# Patient Record
Sex: FEMALE
[De-identification: names, ages, dates, MRNs, and addresses within clinical notes are randomized per-mention and may not be internally consistent; named-entity substitution may affect disease eponyms.]

---

## 2018-08-27 ENCOUNTER — CHARTING TRANS (OUTPATIENT)
Dept: OTHER | Age: 28
End: 2018-08-27

## 2018-08-29 ENCOUNTER — CHARTING TRANS (OUTPATIENT)
Dept: OTHER | Age: 28
End: 2018-08-29

## 2021-05-25 ENCOUNTER — OFFICE VISIT (OUTPATIENT)
Dept: INTERNAL MEDICINE CLINIC | Facility: CLINIC | Age: 31
End: 2021-05-25
Payer: COMMERCIAL

## 2021-05-25 VITALS
DIASTOLIC BLOOD PRESSURE: 84 MMHG | WEIGHT: 211.19 LBS | BODY MASS INDEX: 37.89 KG/M2 | RESPIRATION RATE: 18 BRPM | SYSTOLIC BLOOD PRESSURE: 138 MMHG | HEART RATE: 101 BPM | HEIGHT: 62.5 IN | TEMPERATURE: 98 F

## 2021-05-25 DIAGNOSIS — Z00.00 PHYSICAL EXAM, ANNUAL: Primary | ICD-10-CM

## 2021-05-25 DIAGNOSIS — Z91.89 INCREASED RISK OF BREAST CANCER: ICD-10-CM

## 2021-05-25 PROBLEM — F41.9 ANXIETY: Status: ACTIVE | Noted: 2021-05-25

## 2021-05-25 PROBLEM — E66.09 CLASS 2 OBESITY DUE TO EXCESS CALORIES WITHOUT SERIOUS COMORBIDITY IN ADULT: Status: ACTIVE | Noted: 2021-05-25

## 2021-05-25 PROBLEM — Z87.19 HISTORY OF GLUTEN INTOLERANCE: Status: ACTIVE | Noted: 2021-05-25

## 2021-05-25 PROCEDURE — 99385 PREV VISIT NEW AGE 18-39: CPT | Performed by: INTERNAL MEDICINE

## 2021-05-25 PROCEDURE — 3079F DIAST BP 80-89 MM HG: CPT | Performed by: INTERNAL MEDICINE

## 2021-05-25 PROCEDURE — 3008F BODY MASS INDEX DOCD: CPT | Performed by: INTERNAL MEDICINE

## 2021-05-25 PROCEDURE — 3075F SYST BP GE 130 - 139MM HG: CPT | Performed by: INTERNAL MEDICINE

## 2021-05-25 RX ORDER — BUPROPION HYDROCHLORIDE 150 MG/1
150 TABLET ORAL DAILY
COMMUNITY
Start: 2021-01-30 | End: 2021-09-23

## 2021-05-25 RX ORDER — ALBUTEROL SULFATE 90 UG/1
2 AEROSOL, METERED RESPIRATORY (INHALATION)
COMMUNITY
Start: 2018-06-15

## 2021-05-25 RX ORDER — FLUTICASONE PROPIONATE 50 MCG
2 SPRAY, SUSPENSION (ML) NASAL DAILY
COMMUNITY
Start: 2018-06-15

## 2021-05-25 NOTE — PROGRESS NOTES
HPI/Subjective:   Patient ID: Tresa Ascencio is a 31year old female.   Presents for a physical exam    HPI  Patient has concern about her risk for breast cancer, aunt  from breast cancer, mother is positive for BRCA gene, and was positive as well, sh Resp 18   Ht 5' 2.5\" (1.588 m)   Wt 211 lb 3.2 oz (95.8 kg)   LMP 05/05/2021 (Exact Date)   BMI 38.01 kg/m²   Physical Exam  Constitutional:       Appearance: Normal appearance. She is obese. HENT:      Head: Normocephalic and atraumatic.       Right Ea This Encounter      CBC W Differential W Platelet [E]      Comp Metabolic Panel (14) [E]      Lipid Panel [E]      TSH W Reflex To Free T4 [E]      Meds This Visit:  Requested Prescriptions      No prescriptions requested or ordered in this encounter

## 2021-05-26 ENCOUNTER — TELEPHONE (OUTPATIENT)
Dept: GENETICS | Facility: HOSPITAL | Age: 31
End: 2021-05-26

## 2021-05-27 ENCOUNTER — LAB ENCOUNTER (OUTPATIENT)
Dept: LAB | Age: 31
End: 2021-05-27
Attending: INTERNAL MEDICINE
Payer: COMMERCIAL

## 2021-05-27 DIAGNOSIS — Z00.00 PHYSICAL EXAM, ANNUAL: ICD-10-CM

## 2021-05-27 PROCEDURE — 80061 LIPID PANEL: CPT

## 2021-05-27 PROCEDURE — 80053 COMPREHEN METABOLIC PANEL: CPT

## 2021-05-27 PROCEDURE — 85025 COMPLETE CBC W/AUTO DIFF WBC: CPT

## 2021-05-27 PROCEDURE — 84443 ASSAY THYROID STIM HORMONE: CPT

## 2021-05-27 PROCEDURE — 36415 COLL VENOUS BLD VENIPUNCTURE: CPT

## 2021-05-29 ENCOUNTER — TELEPHONE (OUTPATIENT)
Dept: INTERNAL MEDICINE CLINIC | Facility: CLINIC | Age: 31
End: 2021-05-29

## 2021-07-06 ENCOUNTER — TELEPHONE (OUTPATIENT)
Dept: GENETICS | Facility: HOSPITAL | Age: 31
End: 2021-07-06

## 2021-07-26 ENCOUNTER — TELEPHONE (OUTPATIENT)
Dept: GENETICS | Facility: HOSPITAL | Age: 31
End: 2021-07-26

## 2021-07-26 NOTE — TELEPHONE ENCOUNTER
I left  on the following days, 7/7/21, 7/14/21,7/20/21 asking if patient was interested in [employer]

## 2021-09-23 ENCOUNTER — OFFICE VISIT (OUTPATIENT)
Dept: INTEGRATIVE MEDICINE | Facility: CLINIC | Age: 31
End: 2021-09-23
Payer: COMMERCIAL

## 2021-09-23 VITALS
OXYGEN SATURATION: 98 % | SYSTOLIC BLOOD PRESSURE: 122 MMHG | HEART RATE: 82 BPM | DIASTOLIC BLOOD PRESSURE: 82 MMHG | WEIGHT: 215 LBS | HEIGHT: 62.5 IN | BODY MASS INDEX: 38.57 KG/M2

## 2021-09-23 DIAGNOSIS — E66.09 CLASS 2 OBESITY DUE TO EXCESS CALORIES WITHOUT SERIOUS COMORBIDITY WITH BODY MASS INDEX (BMI) OF 38.0 TO 38.9 IN ADULT: ICD-10-CM

## 2021-09-23 DIAGNOSIS — D25.1 FIBROIDS, INTRAMURAL: ICD-10-CM

## 2021-09-23 DIAGNOSIS — L20.82 FLEXURAL ECZEMA: Primary | ICD-10-CM

## 2021-09-23 PROCEDURE — 3079F DIAST BP 80-89 MM HG: CPT | Performed by: FAMILY MEDICINE

## 2021-09-23 PROCEDURE — 3008F BODY MASS INDEX DOCD: CPT | Performed by: FAMILY MEDICINE

## 2021-09-23 PROCEDURE — 99204 OFFICE O/P NEW MOD 45 MIN: CPT | Performed by: FAMILY MEDICINE

## 2021-09-23 PROCEDURE — 3074F SYST BP LT 130 MM HG: CPT | Performed by: FAMILY MEDICINE

## 2021-09-23 NOTE — PROGRESS NOTES
Vitaly Hamlin is a 36year old female. Patient presents with:  Eczema: discuss food sensitivity testing       HPI:   C/o of eczema starting as a child. Notes recent increase in symptoms over last 1 year. Rash is flat red and flaky.  Occurs mostly over Cream Apply topically 2 (two) times daily as needed. 60 g 3   • FLUoxetine 20 MG Oral Cap      • Albuterol Sulfate  (90 Base) MCG/ACT Inhalation Aero Soln Inhale 2 puffs into the lungs.      • Fluticasone Propionate 50 MCG/ACT Nasal Suspension 2 spra Physically Abused: Not on file      Sexually Abused: Not on file  Housing Stability:       Unable to Pay for Housing in the Last Year: Not on file      Number of Places Lived in the Last Year: Not on file      Unstable Housing in the Last Year: Not on file Mood and Affect: Mood normal.         Behavior: Behavior normal.         Thought Content:  Thought content normal.         ASSESSMENT AND PLAN:     [facility] Lab Encounter on 05/27/2021   Component Date Value Ref Range Status   • Glucose 05/27/2021 93  70 - LDL Cholesterol 05/27/2021 110* <100 mg/dL Final    Desirable <100 mg/dL   Borderline 100-129 mg/dL   High     >=130mg/dL       • VLDL 05/27/2021 18  0 - 30 mg/dL Final   • Non HDL Chol 05/27/2021 128  <130 mg/dL Final    Desirable  <130 mg/dL   Borderline 0.20 x10(3) uL Final   • Immature Granulocyte Absolute 05/27/2021 0.03  0.00 - 1.00 x10(3) uL Final   • Neutrophil % 05/27/2021 58.7  % Final   • Lymphocyte % 05/27/2021 30.7  % Final   • Monocyte % 05/27/2021 6.4  % Final   • Eosinophil % 05/27/2021 2.9 0.1% prescribed  Follow-up in 6 weeks      Given further recommendations as below    Orders Placed This Visit:  Orders Placed This Encounter      C-RP/High Sensitivity      Insulin [E]      Hemoglobin A1C      CBC With Differential With Platelet      Comp starting in the mouth, for fast-acting effects. *    DIM + Vitamin C – Diindolylmethane can promote a less reactive immune system. Vitamin C can support diamine oxidase (MICHELE), an enzyme that metabolizes and inactivates histamine. *    Dosing: Take 4 pumps as as to the Products, including, but not limited to its efficacy, benefits or outcomes. Patient agrees to contact his/her healthcare professional and stop use of Products should any reactions arise. Diet Recommendations:      To do discussed pending [place] causes flare-ups  To figure out what causes atopic dermatitis to flare, keep a list of things that seem to affect your skin. Start by filling in the spaces below. Then keep writing them down in a notebook or diary. The things that affect each person vary.

## 2021-09-23 NOTE — PATIENT INSTRUCTIONS
Integrative/Functional Medicine Testing:    [facility] [place] [facility] [place] Food Sensitivity Testing  This test evaluates 22 of the most common food sensitivities tested on the  test. The test measures IgG and Immune Complexes the same way the  test does. The t minutes before meals. Estrodim: Hormones (especially estrogen) are critically important in human development and tissue repair. However, estrogen’s proliferative effects must be balanced and controlled for optimal health.  Therefore, to ensure prop are not liable for the patients use of the Products. [facility] makes no representations or warranties of any kind, expressed or implied, as to the Products, including, but not limited to its efficacy, benefits or outcomes.   Patient agrees to contact his/her heal stress, harsh soaps, and irritants such as dust or wool. Try to stay away from anything that causes flare-ups. Recognizing what causes flare-ups  To figure out what causes atopic dermatitis to flare, keep a list of things that seem to affect your skin.  Mira Schroeder

## 2021-10-28 ENCOUNTER — LAB ENCOUNTER (OUTPATIENT)
Dept: LAB | Facility: HOSPITAL | Age: 31
End: 2021-10-28
Attending: FAMILY MEDICINE
Payer: COMMERCIAL

## 2021-10-28 DIAGNOSIS — E66.09 CLASS 2 OBESITY DUE TO EXCESS CALORIES WITHOUT SERIOUS COMORBIDITY WITH BODY MASS INDEX (BMI) OF 38.0 TO 38.9 IN ADULT: ICD-10-CM

## 2021-10-28 DIAGNOSIS — D25.1 FIBROIDS, INTRAMURAL: ICD-10-CM

## 2021-10-28 DIAGNOSIS — L20.82 FLEXURAL ECZEMA: ICD-10-CM

## 2021-10-28 PROCEDURE — 83036 HEMOGLOBIN GLYCOSYLATED A1C: CPT

## 2021-10-28 PROCEDURE — 36415 COLL VENOUS BLD VENIPUNCTURE: CPT

## 2021-10-28 PROCEDURE — 86141 C-REACTIVE PROTEIN HS: CPT

## 2021-10-28 PROCEDURE — 86003 ALLG SPEC IGE CRUDE XTRC EA: CPT

## 2021-10-28 PROCEDURE — 85025 COMPLETE CBC W/AUTO DIFF WBC: CPT

## 2021-10-28 PROCEDURE — 80053 COMPREHEN METABOLIC PANEL: CPT

## 2021-10-28 PROCEDURE — 83525 ASSAY OF INSULIN: CPT

## 2021-10-28 PROCEDURE — 86628 CANDIDA ANTIBODY: CPT

## 2021-10-28 PROCEDURE — 82785 ASSAY OF IGE: CPT

## 2021-11-04 ENCOUNTER — OFFICE VISIT (OUTPATIENT)
Dept: INTEGRATIVE MEDICINE | Facility: CLINIC | Age: 31
End: 2021-11-04
Payer: COMMERCIAL

## 2021-11-04 VITALS
SYSTOLIC BLOOD PRESSURE: 104 MMHG | DIASTOLIC BLOOD PRESSURE: 80 MMHG | OXYGEN SATURATION: 97 % | BODY MASS INDEX: 39 KG/M2 | HEIGHT: 62.5 IN | HEART RATE: 97 BPM

## 2021-11-04 DIAGNOSIS — L20.82 FLEXURAL ECZEMA: Primary | ICD-10-CM

## 2021-11-04 DIAGNOSIS — R73.03 PREDIABETES: ICD-10-CM

## 2021-11-04 DIAGNOSIS — D25.1 FIBROIDS, INTRAMURAL: ICD-10-CM

## 2021-11-04 PROBLEM — E66.9 OBESITY (BMI 30.0-34.9): Status: ACTIVE | Noted: 2017-04-24

## 2021-11-04 PROCEDURE — 3074F SYST BP LT 130 MM HG: CPT | Performed by: FAMILY MEDICINE

## 2021-11-04 PROCEDURE — 3079F DIAST BP 80-89 MM HG: CPT | Performed by: FAMILY MEDICINE

## 2021-11-04 PROCEDURE — 99214 OFFICE O/P EST MOD 30 MIN: CPT | Performed by: FAMILY MEDICINE

## 2021-11-04 PROCEDURE — 3008F BODY MASS INDEX DOCD: CPT | Performed by: FAMILY MEDICINE

## 2021-11-04 NOTE — PROGRESS NOTES
Gavino Mueller is a 36year old female. Patient presents with: Follow - Up      HPI:   Follow up for Eczema. No interval change in symptoms at this time. Has started elimination diet but it is early on.   We discussed further laboratory testing at t • FLUoxetine 20 MG Oral Cap      • Albuterol Sulfate  (90 Base) MCG/ACT Inhalation Aero Soln Inhale 2 puffs into the lungs. • Fluticasone Propionate 50 MCG/ACT Nasal Suspension 2 sprays by Nasal route daily.          SOCIAL HISTORY:   Social Hi file  Housing Stability:       Unable to Pay for Housing in the Last Year: Not on file      Number of Places Lived in the Last Year: Not on file      Unstable Housing in the Last Year: Not on file    SURGICAL HISTORY:     Past Surgical History:   Procedure with random or fasting glucose levels since these represent specific points in time.           • Glucose 10/28/2021 93  70 - 99 mg/dL Final   • Sodium 10/28/2021 139  136 - 145 mmol/L Final   • Potassium 10/28/2021 4.3  3.5 - 5.1 mmol/L Final   • Chloride 1 WBC 10/28/2021 10.1  4.0 - 11.0 x10(3) uL Final   • RBC 10/28/2021 5.02  3.80 - 5.30 x10(6)uL Final   • HGB 10/28/2021 13.3  12.0 - 16.0 g/dL Final   • HCT 10/28/2021 41.8  35.0 - 48.0 % Final   • MCV 10/28/2021 83.3  80.0 - 100.0 fL Final   • [facility] 10/28/20 -[facility] 05/27/2021 118  >=60 Final   • ALT 05/27/2021 26  13 - 56 U/L Final   • AST 05/27/2021 17  15 - 37 U/L Final   • Alkaline Phosphatase 05/27/2021 88  37 - 98 U/L Final   • Bilirubin, Total 05/27/2021 0.8  0.1 - 2.0 mg/dL Final   • Total Pro 150 times (5–10 mg) the RDA. It is recommended that physicians ask all patients who may be on biotin supplementation to stop biotin consumption at least 72 hours prior to collection of a new sample.      • WBC 05/27/2021 8.7  4.0 - 11.0 x10(3) uL Final   • sensitivity. Endocrine/: History of fibroids with menorrhagia. Currently under care of OB/GYN. [place] testing is pending at this time. Suspect estrogen dominance with further suspicion of PCOS like endocrine abnormalities.     Skin: History of flexue daily in 4-8 oz of water 10-20 minutes before meal       Metabolic Xtra is a powerful combination of nutrients that helps support insulin receptor function as well as healthy glucose metabolism.  It contains alpha lipoid acid, chromium, berberine, and resve Recommendations:     Hepa air filter see attached handout     Recommend at least 150 minutes exercise per week: 30 minutes/day 5 days a week of combined resistance and cardio.         Additional Information:     Follow up 3 months               No follow-up

## 2021-11-04 NOTE — PATIENT INSTRUCTIONS
Integrative/Functional Medicine Testing:    Take an over-the-counter antihistamine daily such as: Xyzal (levocetirizine), Allegra (fexofenadine)      Supplement/Nutrient Support:          Dosing: Start at 4 pumps twice per day for 4-5 days then tTake 4 pum should always consult a licensed health care professional before starting any supplement, dietary, or exercise program, especially if you are pregnant or have any pre-existing injuries or medical conditions.  The patient agrees that the [facility]

## 2021-11-11 ENCOUNTER — MED REC SCAN ONLY (OUTPATIENT)
Dept: INTEGRATIVE MEDICINE | Facility: CLINIC | Age: 31
End: 2021-11-11

## 2022-01-13 ENCOUNTER — TELEPHONE (OUTPATIENT)
Dept: INTEGRATIVE MEDICINE | Facility: CLINIC | Age: 32
End: 2022-01-13

## 2022-01-13 ENCOUNTER — OFFICE VISIT (OUTPATIENT)
Dept: INTEGRATIVE MEDICINE | Facility: CLINIC | Age: 32
End: 2022-01-13
Payer: COMMERCIAL

## 2022-01-13 VITALS
SYSTOLIC BLOOD PRESSURE: 112 MMHG | DIASTOLIC BLOOD PRESSURE: 78 MMHG | OXYGEN SATURATION: 97 % | BODY MASS INDEX: 39.77 KG/M2 | HEART RATE: 90 BPM | HEIGHT: 62.5 IN | WEIGHT: 221.63 LBS

## 2022-01-13 DIAGNOSIS — D25.1 FIBROIDS, INTRAMURAL: Primary | ICD-10-CM

## 2022-01-13 DIAGNOSIS — R73.03 PREDIABETES: ICD-10-CM

## 2022-01-13 DIAGNOSIS — L20.82 FLEXURAL ECZEMA: ICD-10-CM

## 2022-01-13 DIAGNOSIS — E66.09 CLASS 2 OBESITY DUE TO EXCESS CALORIES WITHOUT SERIOUS COMORBIDITY WITH BODY MASS INDEX (BMI) OF 38.0 TO 38.9 IN ADULT: ICD-10-CM

## 2022-01-13 PROCEDURE — 3074F SYST BP LT 130 MM HG: CPT | Performed by: FAMILY MEDICINE

## 2022-01-13 PROCEDURE — 3078F DIAST BP <80 MM HG: CPT | Performed by: FAMILY MEDICINE

## 2022-01-13 PROCEDURE — 3008F BODY MASS INDEX DOCD: CPT | Performed by: FAMILY MEDICINE

## 2022-01-13 PROCEDURE — 99214 OFFICE O/P EST MOD 30 MIN: CPT | Performed by: FAMILY MEDICINE

## 2022-01-13 RX ORDER — SPIRONOLACTONE 25 MG/1
TABLET ORAL
Qty: 180 TABLET | Refills: 0 | OUTPATIENT
Start: 2022-01-13

## 2022-01-13 RX ORDER — SPIRONOLACTONE 25 MG/1
25 TABLET ORAL 2 TIMES DAILY
Qty: 60 TABLET | Refills: 1 | Status: SHIPPED | OUTPATIENT
Start: 2022-01-13 | End: 2022-03-14

## 2022-01-13 NOTE — PATIENT INSTRUCTIONS
Integrative/Functional Medicine Testing:      Inositol is a component of the B-complex family. It supports healthy  central nervous system function, including emotional wellness, healthy  mood and behavior.  Additionally, it may promote ovarian health    Do disease. Ultimately, you must draw your own conclusion as to the efficacy of the Product and immediately stop use of the Products if a negative reaction should arise.   You should always consult a licensed health care professional before starting any supple Seeds  • Flax Seeds  • Turmeric  • Cherelle  • Olive oil   Avoid   • Sugar  • Artificial Sweeteners  • Saturated and Trans fats   • Hydrogenated Oils  o Canola  o Corn Oil  o Peanut  o Soy  • Omega 6 Fatty Acids  • Refined Carbohydrates  • Processed foods  • raw honey  • 1 Tbsp jf seeds    Directions  1. Combine the quinoa and cashew milk in a saucepan and slowly warm over medium low heat. 2.  Stir in blueberries, cinnamon and walnuts until all are evenly warmed. Remove from heat and stir in raw honey.  Bell Ferrell rounds Canola oil   1 avocado, halved, pitted, peeled and chopped   1 tablespoon red wine vinegar   1 teaspoon Sharan mustard   1 tablespoon coarsely chopped oregano leaves   Honey   Olive oil  Salt   Freshly ground black pepper   1 lemon, zested     Direct leaves     Directions  1. Whisk the vinegar, mustard, lemon juice, garlic, olive oil, salt and pepper in small bowl to blend. 2. Combine the vinaigrette and chicken pieces in a large, resealable plastic bag; seal the bag and toss to coat.  Refrigerate, with the cucumber-tomato relish and cover with a liberal amount of dill dressing.   BLACK FRIED RICE W/ SNAP PEAS  Ingredients  2 tablespoons Organic Extra Virgin Coconut Oil  2 medium carrots, diced  1 small yellow onion, diced  1 bunch scallions, white an sliced  Crushed walnuts     Directions   1. Preheat oven to 350 F.    2.  Mix all ingredients in a large bowl. 3.  Fill greased or paper-lined muffin cups 2/3 full. 4. Add banana slice and walnuts and bake for 15-18 minutes.    NO BAKE ALMOND BARS  Ingre resistance and cardio. Education/Reading:            Additional Information:     Follow up in 2 months

## 2022-01-13 NOTE — PROGRESS NOTES
Rand Lindsay is a 36year old female. Patient presents with:  Lab Results: Hormones , diet       HPI:     Overall notes improvement in eczema with reduction diary and peanut butter. Occasionally has exposure to soy which mildly flares up symptoms. lungs.     • Fluticasone Propionate 50 MCG/ACT Nasal Suspension 2 sprays by Nasal route daily.          SOCIAL HISTORY:   Social History    Socioeconomic History      Marital status: Single      Spouse name: Not on file      Number of children: Not on file normal.         Judgment: Judgment normal.         ASSESSMENT AND PLAN:     [facility] Lab Encounter on 10/28/2021   Component Date Value Ref Range Status   • hsCRP 10/28/2021 11.00* <3.00 mg/L Final    Risk Categories     Low Risk      <1.0 mg/L     Average Risk 10/28/2021 0.9* 1.0 - 2.0 Final   • FASTING 10/28/2021 Yes   Final   • Candida Antibody IgG 10/28/2021 0.19  <=0.88 EV Final    Comment: INTERPRETIVE INFORMATION: Candida Ab, IgG      0.88 EV or less: . ......  Negative - No significant level infection. The best evidence for current infection is a significant   change on two appropriately timed specimens where both tests  are done in the same laboratory at the same time.  However,   low levels of IgM antibodies may occa [id number]  : Aury Estrada MD   • Allergen Clam 10/28/2021 <0.10  <0.10 kUA/L Final   • Allergen Corn 10/28/2021 <0.10  <0.10 kUA/L Final   • Allergen Egg White 10/28/2021 0.17* <0.10 kUA/L Final   • Allergen Fish 10/28/2021 <0.10  <0.10 10/28/2021 0.6  % Final   • Immature Granulocyte % 10/28/2021 0.6  % Final      No results found. 1. Fibroids, intramural    2. Class 2 obesity due to excess calories without serious comorbidity with body mass index (BMI) of 38.0 to 38.9 in adult    3. times daily, with or between meals        Metabolic Xtra is a powerful combination of nutrients that helps support insulin receptor function as well as healthy glucose metabolism. It contains alpha lipoid acid, chromium, berberine, and resveratrol.     [place] or exercise program, especially if you are pregnant or have any pre-existing injuries or medical conditions.  The patient agrees that the [facility] and its affiliates and its [facility] are not liable for the patien Crackers, Pastries, Cereals  • Dairy/Casein  • Alcohol  • Deli Meats       RECIPE IDEAS  Breakfast   ENERGIZING PINEAPPLE SMOOTHIE  Ingredients  • 1 cup brewed and cooled green tea  • 2 cups spinach or kale  • 1 cup frozen pineapple chunks  • ? cup cucumbe seeds.    3.  Serve in bowls and top with raw cacao nibs for an added pop of antioxidants. PCFeed.ca. aspx                    Iván Silvestre eggplant and red onions with canola oil and arrange on the grill. Cook the eggplant until soft and grill the onions until they have a slight al. 2. Remove from the grill to a cutting board and let cool slightly.  Once cool, roughly chop and add them t pieces occasionally, for at least 2 hours and up to one day. 3. Preheat the oven to 400 degrees F. Remove chicken from the bag and arrange pieces on a large, greased baking dish. 4. Roast until the chicken is just cooked through, about 1 hour.  If y , thinly sliced  1 cup thinly sliced snap peas  2 garlic cloves, minced  1 tablespoon minced fresh colleen  3 cups cooked black rice (from 1 cup uncooked)  3 tablespoons Liquid Aminos  2 teaspoons toasted sesame oil  1 teaspoon sriracha  2 eggs, be almond flour  3/4 cup unsweetened finely shredded coconut  3/4 cup equivalent powdered sweetener (Use powdered coconut sugar for Paleo or powdered sweetener)  1 cup + 2 Tbsp almond butter (or any nut butter)  2 Tbsp coconut oil  4 1/2 oz dark chocolate?

## 2022-03-17 ENCOUNTER — OFFICE VISIT (OUTPATIENT)
Dept: INTEGRATIVE MEDICINE | Facility: CLINIC | Age: 32
End: 2022-03-17
Payer: COMMERCIAL

## 2022-03-17 VITALS
OXYGEN SATURATION: 98 % | HEIGHT: 62.5 IN | DIASTOLIC BLOOD PRESSURE: 72 MMHG | SYSTOLIC BLOOD PRESSURE: 100 MMHG | BODY MASS INDEX: 40 KG/M2 | HEART RATE: 80 BPM

## 2022-03-17 DIAGNOSIS — R73.03 PREDIABETES: ICD-10-CM

## 2022-03-17 DIAGNOSIS — L20.82 FLEXURAL ECZEMA: ICD-10-CM

## 2022-03-17 DIAGNOSIS — D25.1 FIBROIDS, INTRAMURAL: ICD-10-CM

## 2022-03-17 DIAGNOSIS — B37.9 CANDIDIASIS: Primary | ICD-10-CM

## 2022-03-17 PROCEDURE — 99214 OFFICE O/P EST MOD 30 MIN: CPT | Performed by: FAMILY MEDICINE

## 2022-03-17 PROCEDURE — 3008F BODY MASS INDEX DOCD: CPT | Performed by: FAMILY MEDICINE

## 2022-03-17 PROCEDURE — 3078F DIAST BP <80 MM HG: CPT | Performed by: FAMILY MEDICINE

## 2022-03-17 PROCEDURE — 3074F SYST BP LT 130 MM HG: CPT | Performed by: FAMILY MEDICINE

## 2022-03-17 RX ORDER — NYSTATIN 500000 [USP'U]/1
1 TABLET, COATED ORAL EVERY 8 HOURS SCHEDULED
Qty: 90 TABLET | Refills: 0 | Status: SHIPPED | OUTPATIENT
Start: 2022-03-17

## 2022-04-01 ENCOUNTER — NURSE ONLY (OUTPATIENT)
Dept: LAB | Facility: HOSPITAL | Age: 32
End: 2022-04-01
Attending: OBSTETRICS & GYNECOLOGY
Payer: COMMERCIAL

## 2022-04-01 DIAGNOSIS — B37.9 CANDIDIASIS: ICD-10-CM

## 2022-04-01 DIAGNOSIS — D25.1 FIBROIDS, INTRAMURAL: ICD-10-CM

## 2022-04-01 DIAGNOSIS — Z01.818 PRE-OP TESTING: ICD-10-CM

## 2022-04-01 LAB
ALBUMIN SERPL-MCNC: 3.7 G/DL (ref 3.4–5)
ALBUMIN/GLOB SERPL: 1 {RATIO} (ref 1–2)
ALP LIVER SERPL-CCNC: 88 U/L
ALT SERPL-CCNC: 29 U/L
ANION GAP SERPL CALC-SCNC: 6 MMOL/L (ref 0–18)
AST SERPL-CCNC: 17 U/L (ref 15–37)
B-HCG SERPL-ACNC: <1 MIU/ML
BASOPHILS # BLD AUTO: 0.04 X10(3) UL (ref 0–0.2)
BASOPHILS NFR BLD AUTO: 0.5 %
BILIRUB SERPL-MCNC: 0.6 MG/DL (ref 0.1–2)
BUN BLD-MCNC: 10 MG/DL (ref 7–18)
BUN/CREAT SERPL: 13.2 (ref 10–20)
CALCIUM BLD-MCNC: 9.2 MG/DL (ref 8.5–10.1)
CHLORIDE SERPL-SCNC: 105 MMOL/L (ref 98–112)
CO2 SERPL-SCNC: 28 MMOL/L (ref 21–32)
CREAT BLD-MCNC: 0.76 MG/DL
DEPRECATED RDW RBC AUTO: 43.6 FL (ref 35.1–46.3)
EOSINOPHIL # BLD AUTO: 0.17 X10(3) UL (ref 0–0.7)
EOSINOPHIL NFR BLD AUTO: 2.1 %
ERYTHROCYTE [DISTWIDTH] IN BLOOD BY AUTOMATED COUNT: 14 % (ref 11–15)
EST. AVERAGE GLUCOSE BLD GHB EST-MCNC: 114 MG/DL (ref 68–126)
FASTING STATUS PATIENT QL REPORTED: YES
GLOBULIN PLAS-MCNC: 3.6 G/DL (ref 2.8–4.4)
GLUCOSE BLD-MCNC: 86 MG/DL (ref 70–99)
HBA1C MFR BLD: 5.6 % (ref ?–5.7)
HBV SURFACE AG SER-ACNC: <0.1 [IU]/L
HBV SURFACE AG SERPL QL IA: NONREACTIVE
HCT VFR BLD AUTO: 41.3 %
HCV AB SERPL QL IA: NONREACTIVE
HGB BLD-MCNC: 12.5 G/DL
IMM GRANULOCYTES # BLD AUTO: 0.03 X10(3) UL (ref 0–1)
IMM GRANULOCYTES NFR BLD: 0.4 %
INSULIN SERPL-ACNC: 22 MU/L (ref 3–25)
LYMPHOCYTES # BLD AUTO: 2.74 X10(3) UL (ref 1–4)
LYMPHOCYTES NFR BLD AUTO: 33.4 %
MCH RBC QN AUTO: 25.9 PG (ref 26–34)
MCHC RBC AUTO-ENTMCNC: 30.3 G/DL (ref 31–37)
MCV RBC AUTO: 85.5 FL
MONOCYTES # BLD AUTO: 0.53 X10(3) UL (ref 0.1–1)
MONOCYTES NFR BLD AUTO: 6.5 %
NEUTROPHILS # BLD AUTO: 4.69 X10 (3) UL (ref 1.5–7.7)
NEUTROPHILS # BLD AUTO: 4.69 X10(3) UL (ref 1.5–7.7)
NEUTROPHILS NFR BLD AUTO: 57.1 %
OSMOLALITY SERPL CALC.SUM OF ELEC: 286 MOSM/KG (ref 275–295)
PLATELET # BLD AUTO: 445 10(3)UL (ref 150–450)
POTASSIUM SERPL-SCNC: 4.6 MMOL/L (ref 3.5–5.1)
PROT SERPL-MCNC: 7.3 G/DL (ref 6.4–8.2)
RBC # BLD AUTO: 4.83 X10(6)UL
SODIUM SERPL-SCNC: 139 MMOL/L (ref 136–145)
WBC # BLD AUTO: 8.2 X10(3) UL (ref 4–11)

## 2022-04-01 PROCEDURE — 80053 COMPREHEN METABOLIC PANEL: CPT

## 2022-04-01 PROCEDURE — 86803 HEPATITIS C AB TEST: CPT

## 2022-04-01 PROCEDURE — 36415 COLL VENOUS BLD VENIPUNCTURE: CPT

## 2022-04-01 PROCEDURE — 87389 HIV-1 AG W/HIV-1&-2 AB AG IA: CPT

## 2022-04-01 PROCEDURE — 83036 HEMOGLOBIN GLYCOSYLATED A1C: CPT

## 2022-04-01 PROCEDURE — 87340 HEPATITIS B SURFACE AG IA: CPT

## 2022-04-01 PROCEDURE — 85025 COMPLETE CBC W/AUTO DIFF WBC: CPT

## 2022-04-01 PROCEDURE — 84702 CHORIONIC GONADOTROPIN TEST: CPT

## 2022-04-01 PROCEDURE — 83525 ASSAY OF INSULIN: CPT

## 2022-04-01 RX ORDER — CETIRIZINE HYDROCHLORIDE 10 MG/1
10 TABLET ORAL DAILY
COMMUNITY

## 2022-04-01 RX ORDER — MULTIVITAMIN WITH IRON
1 TABLET ORAL DAILY
COMMUNITY

## 2022-04-01 RX ORDER — MAG HYDROX/ALUMINUM HYD/SIMETH 400-400-40
5000 SUSPENSION, ORAL (FINAL DOSE FORM) ORAL DAILY
COMMUNITY

## 2022-04-01 RX ORDER — SPIRONOLACTONE 25 MG/1
25 TABLET ORAL 2 TIMES DAILY
COMMUNITY

## 2022-04-01 RX ORDER — CHLORAL HYDRATE 500 MG
1000 CAPSULE ORAL 2 TIMES DAILY
COMMUNITY

## 2022-04-03 ENCOUNTER — LAB ENCOUNTER (OUTPATIENT)
Dept: LAB | Facility: HOSPITAL | Age: 32
End: 2022-04-03
Attending: OBSTETRICS & GYNECOLOGY
Payer: COMMERCIAL

## 2022-04-03 DIAGNOSIS — Z01.818 PRE-OP TESTING: ICD-10-CM

## 2022-04-04 LAB — SARS-COV-2 RNA RESP QL NAA+PROBE: NOT DETECTED

## 2022-04-06 ENCOUNTER — ANESTHESIA (OUTPATIENT)
Dept: SURGERY | Facility: HOSPITAL | Age: 32
End: 2022-04-06
Payer: COMMERCIAL

## 2022-04-06 ENCOUNTER — ANESTHESIA EVENT (OUTPATIENT)
Dept: SURGERY | Facility: HOSPITAL | Age: 32
End: 2022-04-06
Payer: COMMERCIAL

## 2022-04-06 ENCOUNTER — HOSPITAL ENCOUNTER (OUTPATIENT)
Facility: HOSPITAL | Age: 32
Setting detail: HOSPITAL OUTPATIENT SURGERY
Discharge: HOME | End: 2022-04-06
Attending: OBSTETRICS & GYNECOLOGY | Admitting: OBSTETRICS & GYNECOLOGY
Payer: COMMERCIAL

## 2022-04-06 VITALS
HEART RATE: 92 BPM | HEIGHT: 62.5 IN | BODY MASS INDEX: 39.05 KG/M2 | WEIGHT: 217.63 LBS | RESPIRATION RATE: 18 BRPM | DIASTOLIC BLOOD PRESSURE: 77 MMHG | SYSTOLIC BLOOD PRESSURE: 119 MMHG | OXYGEN SATURATION: 99 % | TEMPERATURE: 97 F

## 2022-04-06 DIAGNOSIS — Z01.818 PRE-OP TESTING: Primary | ICD-10-CM

## 2022-04-06 LAB — B-HCG UR QL: NEGATIVE

## 2022-04-06 PROCEDURE — 51702 INSERT TEMP BLADDER CATH: CPT | Performed by: OBSTETRICS & GYNECOLOGY

## 2022-04-06 PROCEDURE — 51798 US URINE CAPACITY MEASURE: CPT | Performed by: OBSTETRICS & GYNECOLOGY

## 2022-04-06 PROCEDURE — 88305 TISSUE EXAM BY PATHOLOGIST: CPT | Performed by: OBSTETRICS & GYNECOLOGY

## 2022-04-06 PROCEDURE — 0UJD8ZZ INSPECTION OF UTERUS AND CERVIX, VIA NATURAL OR ARTIFICIAL OPENING ENDOSCOPIC: ICD-10-PCS | Performed by: OBSTETRICS & GYNECOLOGY

## 2022-04-06 PROCEDURE — 81025 URINE PREGNANCY TEST: CPT

## 2022-04-06 PROCEDURE — 0UB94ZZ EXCISION OF UTERUS, PERCUTANEOUS ENDOSCOPIC APPROACH: ICD-10-PCS | Performed by: OBSTETRICS & GYNECOLOGY

## 2022-04-06 DEVICE — ABSORBABLE ADHESION BARRIER, OXIDIZED REGENERATED CELLULOSE
Type: IMPLANTABLE DEVICE | Site: UTERUS | Status: FUNCTIONAL
Brand: GYNECARE INTERCEED

## 2022-04-06 RX ORDER — ONDANSETRON 2 MG/ML
INJECTION INTRAMUSCULAR; INTRAVENOUS AS NEEDED
Status: DISCONTINUED | OUTPATIENT
Start: 2022-04-06 | End: 2022-04-06 | Stop reason: SURG

## 2022-04-06 RX ORDER — SODIUM CHLORIDE, SODIUM LACTATE, POTASSIUM CHLORIDE, CALCIUM CHLORIDE 600; 310; 30; 20 MG/100ML; MG/100ML; MG/100ML; MG/100ML
INJECTION, SOLUTION INTRAVENOUS CONTINUOUS
Status: DISCONTINUED | OUTPATIENT
Start: 2022-04-06 | End: 2022-04-06

## 2022-04-06 RX ORDER — HYDROCODONE BITARTRATE AND ACETAMINOPHEN 5; 325 MG/1; MG/1
2 TABLET ORAL AS NEEDED
Status: COMPLETED | OUTPATIENT
Start: 2022-04-06 | End: 2022-04-06

## 2022-04-06 RX ORDER — DEXAMETHASONE SODIUM PHOSPHATE 4 MG/ML
4 VIAL (ML) INJECTION AS NEEDED
Status: DISCONTINUED | OUTPATIENT
Start: 2022-04-06 | End: 2022-04-06

## 2022-04-06 RX ORDER — HYDROMORPHONE HYDROCHLORIDE 1 MG/ML
0.5 INJECTION, SOLUTION INTRAMUSCULAR; INTRAVENOUS; SUBCUTANEOUS EVERY 5 MIN PRN
Status: DISCONTINUED | OUTPATIENT
Start: 2022-04-06 | End: 2022-04-06

## 2022-04-06 RX ORDER — NALOXONE HYDROCHLORIDE 0.4 MG/ML
80 INJECTION, SOLUTION INTRAMUSCULAR; INTRAVENOUS; SUBCUTANEOUS AS NEEDED
Status: DISCONTINUED | OUTPATIENT
Start: 2022-04-06 | End: 2022-04-06

## 2022-04-06 RX ORDER — NEOSTIGMINE METHYLSULFATE 1 MG/ML
INJECTION INTRAVENOUS AS NEEDED
Status: DISCONTINUED | OUTPATIENT
Start: 2022-04-06 | End: 2022-04-06 | Stop reason: SURG

## 2022-04-06 RX ORDER — ACETAMINOPHEN 500 MG
1000 TABLET ORAL ONCE AS NEEDED
Status: DISCONTINUED | OUTPATIENT
Start: 2022-04-06 | End: 2022-04-06

## 2022-04-06 RX ORDER — LABETALOL HYDROCHLORIDE 5 MG/ML
5 INJECTION, SOLUTION INTRAVENOUS EVERY 5 MIN PRN
Status: DISCONTINUED | OUTPATIENT
Start: 2022-04-06 | End: 2022-04-06

## 2022-04-06 RX ORDER — KETOROLAC TROMETHAMINE 30 MG/ML
INJECTION, SOLUTION INTRAMUSCULAR; INTRAVENOUS AS NEEDED
Status: DISCONTINUED | OUTPATIENT
Start: 2022-04-06 | End: 2022-04-06 | Stop reason: SURG

## 2022-04-06 RX ORDER — EPHEDRINE SULFATE 50 MG/ML
INJECTION INTRAVENOUS AS NEEDED
Status: DISCONTINUED | OUTPATIENT
Start: 2022-04-06 | End: 2022-04-06 | Stop reason: SURG

## 2022-04-06 RX ORDER — BUPIVACAINE HYDROCHLORIDE 5 MG/ML
INJECTION, SOLUTION EPIDURAL; INTRACAUDAL AS NEEDED
Status: DISCONTINUED | OUTPATIENT
Start: 2022-04-06 | End: 2022-04-06 | Stop reason: HOSPADM

## 2022-04-06 RX ORDER — MIDAZOLAM HYDROCHLORIDE 1 MG/ML
1 INJECTION INTRAMUSCULAR; INTRAVENOUS EVERY 5 MIN PRN
Status: DISCONTINUED | OUTPATIENT
Start: 2022-04-06 | End: 2022-04-06

## 2022-04-06 RX ORDER — PHENAZOPYRIDINE HYDROCHLORIDE 200 MG/1
TABLET, FILM COATED ORAL
Status: COMPLETED
Start: 2022-04-06 | End: 2022-04-06

## 2022-04-06 RX ORDER — MEPERIDINE HYDROCHLORIDE 25 MG/ML
12.5 INJECTION INTRAMUSCULAR; INTRAVENOUS; SUBCUTANEOUS AS NEEDED
Status: DISCONTINUED | OUTPATIENT
Start: 2022-04-06 | End: 2022-04-06

## 2022-04-06 RX ORDER — ROCURONIUM BROMIDE 10 MG/ML
INJECTION, SOLUTION INTRAVENOUS AS NEEDED
Status: DISCONTINUED | OUTPATIENT
Start: 2022-04-06 | End: 2022-04-06 | Stop reason: SURG

## 2022-04-06 RX ORDER — KETAMINE HYDROCHLORIDE 50 MG/ML
INJECTION, SOLUTION, CONCENTRATE INTRAMUSCULAR; INTRAVENOUS AS NEEDED
Status: DISCONTINUED | OUTPATIENT
Start: 2022-04-06 | End: 2022-04-06 | Stop reason: SURG

## 2022-04-06 RX ORDER — CLINDAMYCIN PHOSPHATE 900 MG/50ML
INJECTION INTRAVENOUS
Status: DISCONTINUED
Start: 2022-04-06 | End: 2022-04-06

## 2022-04-06 RX ORDER — GLYCOPYRROLATE 0.2 MG/ML
INJECTION, SOLUTION INTRAMUSCULAR; INTRAVENOUS AS NEEDED
Status: DISCONTINUED | OUTPATIENT
Start: 2022-04-06 | End: 2022-04-06 | Stop reason: SURG

## 2022-04-06 RX ORDER — MIDAZOLAM HYDROCHLORIDE 1 MG/ML
INJECTION INTRAMUSCULAR; INTRAVENOUS AS NEEDED
Status: DISCONTINUED | OUTPATIENT
Start: 2022-04-06 | End: 2022-04-06 | Stop reason: SURG

## 2022-04-06 RX ORDER — DEXAMETHASONE SODIUM PHOSPHATE 4 MG/ML
VIAL (ML) INJECTION AS NEEDED
Status: DISCONTINUED | OUTPATIENT
Start: 2022-04-06 | End: 2022-04-06 | Stop reason: SURG

## 2022-04-06 RX ORDER — CLINDAMYCIN PHOSPHATE 900 MG/50ML
900 INJECTION INTRAVENOUS ONCE
Status: COMPLETED | OUTPATIENT
Start: 2022-04-06 | End: 2022-04-06

## 2022-04-06 RX ORDER — VASOPRESSIN 20 U/ML
INJECTION PARENTERAL AS NEEDED
Status: DISCONTINUED | OUTPATIENT
Start: 2022-04-06 | End: 2022-04-06 | Stop reason: HOSPADM

## 2022-04-06 RX ORDER — PHENAZOPYRIDINE HYDROCHLORIDE 200 MG/1
200 TABLET, FILM COATED ORAL ONCE
Status: COMPLETED | OUTPATIENT
Start: 2022-04-06 | End: 2022-04-06

## 2022-04-06 RX ORDER — DIPHENHYDRAMINE HYDROCHLORIDE 50 MG/ML
12.5 INJECTION INTRAMUSCULAR; INTRAVENOUS AS NEEDED
Status: DISCONTINUED | OUTPATIENT
Start: 2022-04-06 | End: 2022-04-06

## 2022-04-06 RX ORDER — ONDANSETRON 2 MG/ML
4 INJECTION INTRAMUSCULAR; INTRAVENOUS AS NEEDED
Status: DISCONTINUED | OUTPATIENT
Start: 2022-04-06 | End: 2022-04-06

## 2022-04-06 RX ORDER — METOCLOPRAMIDE HYDROCHLORIDE 5 MG/ML
10 INJECTION INTRAMUSCULAR; INTRAVENOUS AS NEEDED
Status: DISCONTINUED | OUTPATIENT
Start: 2022-04-06 | End: 2022-04-06

## 2022-04-06 RX ORDER — ACETAMINOPHEN 500 MG
1000 TABLET ORAL ONCE
Status: DISCONTINUED | OUTPATIENT
Start: 2022-04-06 | End: 2022-04-06 | Stop reason: HOSPADM

## 2022-04-06 RX ORDER — TRANEXAMIC ACID 10 MG/ML
INJECTION, SOLUTION INTRAVENOUS AS NEEDED
Status: DISCONTINUED | OUTPATIENT
Start: 2022-04-06 | End: 2022-04-06 | Stop reason: SURG

## 2022-04-06 RX ORDER — PHENYLEPHRINE HCL 10 MG/ML
VIAL (ML) INJECTION AS NEEDED
Status: DISCONTINUED | OUTPATIENT
Start: 2022-04-06 | End: 2022-04-06 | Stop reason: SURG

## 2022-04-06 RX ORDER — HYDROCODONE BITARTRATE AND ACETAMINOPHEN 5; 325 MG/1; MG/1
1 TABLET ORAL AS NEEDED
Status: COMPLETED | OUTPATIENT
Start: 2022-04-06 | End: 2022-04-06

## 2022-04-06 RX ADMIN — ONDANSETRON 4 MG: 2 INJECTION INTRAMUSCULAR; INTRAVENOUS at 08:56:00

## 2022-04-06 RX ADMIN — EPHEDRINE SULFATE 10 MG: 50 INJECTION INTRAVENOUS at 10:33:00

## 2022-04-06 RX ADMIN — CLINDAMYCIN PHOSPHATE 900 MG: 900 INJECTION INTRAVENOUS at 07:50:00

## 2022-04-06 RX ADMIN — GLYCOPYRROLATE 0.6 MG: 0.2 INJECTION, SOLUTION INTRAMUSCULAR; INTRAVENOUS at 11:51:00

## 2022-04-06 RX ADMIN — ROCURONIUM BROMIDE 40 MG: 10 INJECTION, SOLUTION INTRAVENOUS at 07:41:00

## 2022-04-06 RX ADMIN — NEOSTIGMINE METHYLSULFATE 3 MG: 1 INJECTION INTRAVENOUS at 11:51:00

## 2022-04-06 RX ADMIN — EPHEDRINE SULFATE 10 MG: 50 INJECTION INTRAVENOUS at 08:05:00

## 2022-04-06 RX ADMIN — ROCURONIUM BROMIDE 10 MG: 10 INJECTION, SOLUTION INTRAVENOUS at 09:30:00

## 2022-04-06 RX ADMIN — KETAMINE HYDROCHLORIDE 25 MG: 50 INJECTION, SOLUTION, CONCENTRATE INTRAMUSCULAR; INTRAVENOUS at 08:10:00

## 2022-04-06 RX ADMIN — DEXAMETHASONE SODIUM PHOSPHATE 4 MG: 4 MG/ML VIAL (ML) INJECTION at 08:00:00

## 2022-04-06 RX ADMIN — TRANEXAMIC ACID 1000 MG: 10 INJECTION, SOLUTION INTRAVENOUS at 09:33:00

## 2022-04-06 RX ADMIN — PHENYLEPHRINE HCL 100 MCG: 10 MG/ML VIAL (ML) INJECTION at 08:03:00

## 2022-04-06 RX ADMIN — EPHEDRINE SULFATE 10 MG: 50 INJECTION INTRAVENOUS at 09:03:00

## 2022-04-06 RX ADMIN — KETOROLAC TROMETHAMINE 30 MG: 30 INJECTION, SOLUTION INTRAMUSCULAR; INTRAVENOUS at 11:43:00

## 2022-04-06 RX ADMIN — PHENYLEPHRINE HCL 50 MCG: 10 MG/ML VIAL (ML) INJECTION at 09:47:00

## 2022-04-06 RX ADMIN — SODIUM CHLORIDE, SODIUM LACTATE, POTASSIUM CHLORIDE, CALCIUM CHLORIDE: 600; 310; 30; 20 INJECTION, SOLUTION INTRAVENOUS at 10:32:00

## 2022-04-06 RX ADMIN — PHENYLEPHRINE HCL 50 MCG: 10 MG/ML VIAL (ML) INJECTION at 11:12:00

## 2022-04-06 RX ADMIN — SODIUM CHLORIDE, SODIUM LACTATE, POTASSIUM CHLORIDE, CALCIUM CHLORIDE: 600; 310; 30; 20 INJECTION, SOLUTION INTRAVENOUS at 11:53:00

## 2022-04-06 RX ADMIN — PHENYLEPHRINE HCL 50 MCG: 10 MG/ML VIAL (ML) INJECTION at 11:32:00

## 2022-04-06 RX ADMIN — MIDAZOLAM HYDROCHLORIDE 2 MG: 1 INJECTION INTRAMUSCULAR; INTRAVENOUS at 07:39:00

## 2022-04-06 NOTE — ANESTHESIA PROCEDURE NOTES
Airway  Date/Time: 4/6/2022 7:43 AM  Urgency: elective      General Information and Staff    Patient location during procedure: OR  Anesthesiologist: Diana Irving MD  Performed: anesthesiologist     Indications and Patient Condition  Indications for airway management: anesthesia  Spontaneous Ventilation: absent  Sedation level: deep  Preoxygenated: yes  Patient position: sniffing  Mask difficulty assessment: 1 - vent by mask    Final Airway Details  Final airway type: endotracheal airway      Successful airway: ETT  Cuffed: yes   Successful intubation technique: direct laryngoscopy  Endotracheal tube insertion site: oral  Blade: Giovanna  Blade size: #3  ETT size (mm): 7.0    Cormack-Lehane Classification: grade I - full view of glottis  Placement verified by: chest auscultation and capnometry   Measured from: lips  ETT to lips (cm): 21  Number of attempts at approach: 1

## 2022-04-06 NOTE — BRIEF OP NOTE
Pre-Operative Diagnosis: FIBROIDS     Post-Operative Diagnosis: FIBROIDS      Procedure Performed:   HYSTEROSCOPY, LAPAROSCOPIC MYOMECTOMY, MORCELLATION IN A BAG lysis of adhesions, enterolysis    Surgeon(s) and Role:     * Jeanna Snow MD - Primary     * Jose J Jaquez MD - Assisting Surgeon    Assistant(s):  RNFA: Phil Reyes RN     Surgical Findings: fibroids, adhesions     Specimen: fibroids     Estimated Blood Loss: 50cc      Md Yves Rose MD  4/6/2022  9:09 AM

## 2022-04-06 NOTE — INTERVAL H&P NOTE
Pre-op Diagnosis: FIBROIDS    The above referenced H&P was reviewed by Sean Springer MD on 4/6/2022, the patient was examined and no significant changes have occurred in the patient's condition since the H&P was performed. I discussed with the patient and/or legal representative the potential benefits, risks and side effects of this procedure; the likelihood of the patient achieving goals; and potential problems that might occur during recuperation. I discussed reasonable alternatives to the procedure, including risks, benefits and side effects related to the alternatives and risks related to not receiving this procedure. We will proceed with procedure as planned.

## 2022-04-07 NOTE — OPERATIVE REPORT
[facility]    PATIENT'S NAME: Brigette Villegas   ATTENDING PHYSICIAN: Alessia Mcqueen M.D. OPERATING PHYSICIAN: Alessia Mcqueen M.D. PATIENT ACCOUNT#:   [de-identified]    LOCATION:  [facility] 8 EDWP 10  MEDICAL RECORD #:   [id number]       YOB: 1990  ADMISSION DATE:       04/06/2022      OPERATION DATE:  04/06/2022    OPERATIVE REPORT      The patient is a private patient of Dr. Alessia Mcqueen. PREOPERATIVE DIAGNOSIS:  Symptomatic uterine fibroids. POSTOPERATIVE DIAGNOSIS:  Symptomatic uterine fibroids, plus severe right periadnexal adhesions. PROCEDURE:  Examination under anesthesia; hysteroscopy; operative laparoscopy; lysis of severe right periadnexal adhesions, status post appendectomy; multiple myomectomy x4; morcellation in a containment system. ASSISTANT SURGEON:  Alfonso Elizalde MD    ASSISTANT:  Danielle White RN, FA     ANESTHESIA:  Procedure performed under general endotracheal anesthesia. ESTIMATED BLOOD LOSS:  50 mL. COMPLICATIONS:  There were no complications. FINDINGS:  On examination under anesthesia, the uterus was approximately 16-week size. Uterus sounds to 9 cm. Hysteroscopy reveals normal uterine cavity. Both tubal ostia were noted to be normal.  On operative laparoscopy, there was noted to be a normal liver edge and gallbladder. The patient is status post appendectomy. There were severe adhesions from the bowel to the right pelvic sidewall and from the bowel to the adnexa on the right. This is status post appendectomy. Ultimately, we were able to mobilize all these adhesions, and the right adnexa were now completely mobile. The left adnexa were completely mobile, and both adnexa appeared normal.  The uterus was markedly distorted secondary to fibroids. There was noted to be a 7 cm left fundal type 4 to 6 fibroid. Also next to it was a fundal, midline to right 2.5 cm fundal type 5/6 uterine fibroid.   There was noted to be an 8 cm posterior left type 2 to 6 uterine fibroid. There was a small area that was next to the endometrial cavity, but we did not enter it, less than 1 cm. Finally, there was a 2 cm anterior fibroid that was intramuscular to subserosal , type 4 to 6. All fibroids ultimately were excised and the uterus reconstructed. There was no evidence of endometriosis. OPERATIVE TECHNIQUE:  The patient was intubated for the purpose of general endotracheal anesthesia, prepped and draped in usual manner for laparoscopy. Bladder catheterized. Patient examined under anesthesia. Findings noted above. Weighted speculum introduced into the vaginal vault. Anterior lip of the cervix was grasped with a single-tooth tenaculum. Uterus sounded to 9 cm. Hysteroscopy was now performed. Normal uterine cavity was noted. Having performed that, a Jarcho cannula was placed. Attention was now directed toward the umbilicus. The umbilicus was everted with an Allis clamp, and 2 towel clamps were placed on either side of the umbilicus. A small rent made mid umbilical with a #54 blade. Through this, a Veress needle was placed. Insufflation started with carbon dioxide gas, and when the pressure reached 14 mmHg, the Veress needle was removed, replaced by laparoscopy trocar and sleeve. Then, 1 side trocar was removed and replaced by laparoscope. Second and third punctures were now placed laterally under direct visualization. Because of the size of the fibroid uterus, a port was placed above for visualization as well. Our attention was first directed toward the adhesions of the bowel to right adnexa, as well as the bowel to the right pelvic sidewall. These adhesions were now taken down to mobilize not only the bowel, but the adnexa as well. The left adnexa appeared totally normal.    Now, our attention was directed toward the fundal fibroids. The area was infiltrated with dilute Pitressin solution.   Now, a horizontal incision was made over the 2 fibroids across the length of the fundus. The fibroids were now dissected out. The uterus was now repaired in 3 layers of 3-0 V-Loc suture, with the final being a baseball-style closure. The same dissection and removal of fibroids was performed anteriorly and also posteriorly. Posteriorly, we were next to the uterine cavity. Ultimately, we were next to the endometrial cavity, and again, we repaired the uterus in 3 layers, the final being a baseball-style closure of 3-0 V-Loc suture. At the end of the procedure, the Espiner bag was placed through the umbilicus. The fibroids were placed in the bag, the mouth of the bag brought out through the umbilicus. Now, the 12 mm port was placed through the umbilicus. Under direct visualization from midline upper port, we were able to calhoun the bag under direct visualization. This allowed us to place a laparoscope from this right port and to place the morcellator through the mouth of the bag at the umbilicus. Now, morcellation was performed without complication. Once the fibroids had been removed, the bag removed, the laparoscope replaced, and meticulous hemostasis noted. Interceed was now placed over the repaired uterine defect, abdomen suctioned, incision closed in the usual fashion. Patient extubated and taken to recovery room in satisfactory condition.      Dictated By López Wakefield M.D.  d: 04/06/2022 12:56:44  t: 04/06/2022 20:49:26  Job [id number]  PQ/

## 2022-05-05 RX ORDER — SPIRONOLACTONE 25 MG/1
TABLET ORAL
Qty: 60 TABLET | Refills: 0 | Status: SHIPPED | OUTPATIENT
Start: 2022-05-05 | End: 2022-05-05

## 2022-05-05 RX ORDER — SPIRONOLACTONE 25 MG/1
TABLET ORAL
Qty: 180 TABLET | Refills: 0 | Status: SHIPPED | OUTPATIENT
Start: 2022-05-05

## 2022-06-17 ENCOUNTER — OFFICE VISIT (OUTPATIENT)
Dept: OBGYN CLINIC | Facility: CLINIC | Age: 32
End: 2022-06-17
Payer: COMMERCIAL

## 2022-06-17 VITALS
WEIGHT: 218.38 LBS | SYSTOLIC BLOOD PRESSURE: 120 MMHG | BODY MASS INDEX: 39.18 KG/M2 | HEIGHT: 62.5 IN | DIASTOLIC BLOOD PRESSURE: 80 MMHG

## 2022-06-17 DIAGNOSIS — D21.9 FIBROIDS: Primary | ICD-10-CM

## 2022-06-17 DIAGNOSIS — E28.2 PCOS (POLYCYSTIC OVARIAN SYNDROME): ICD-10-CM

## 2022-06-17 PROCEDURE — 3079F DIAST BP 80-89 MM HG: CPT | Performed by: OBSTETRICS & GYNECOLOGY

## 2022-06-17 PROCEDURE — 99203 OFFICE O/P NEW LOW 30 MIN: CPT | Performed by: OBSTETRICS & GYNECOLOGY

## 2022-06-17 PROCEDURE — 3008F BODY MASS INDEX DOCD: CPT | Performed by: OBSTETRICS & GYNECOLOGY

## 2022-06-17 PROCEDURE — 3074F SYST BP LT 130 MM HG: CPT | Performed by: OBSTETRICS & GYNECOLOGY

## 2022-06-23 ENCOUNTER — OFFICE VISIT (OUTPATIENT)
Dept: INTEGRATIVE MEDICINE | Facility: CLINIC | Age: 32
End: 2022-06-23
Payer: COMMERCIAL

## 2022-06-23 VITALS
SYSTOLIC BLOOD PRESSURE: 122 MMHG | OXYGEN SATURATION: 98 % | WEIGHT: 218 LBS | HEART RATE: 110 BPM | HEIGHT: 62.5 IN | BODY MASS INDEX: 39.11 KG/M2 | DIASTOLIC BLOOD PRESSURE: 78 MMHG

## 2022-06-23 DIAGNOSIS — L20.82 FLEXURAL ECZEMA: Primary | ICD-10-CM

## 2022-06-23 DIAGNOSIS — R73.03 PREDIABETES: ICD-10-CM

## 2022-06-23 DIAGNOSIS — D25.1 FIBROIDS, INTRAMURAL: ICD-10-CM

## 2022-06-23 DIAGNOSIS — E28.2 PCOS (POLYCYSTIC OVARIAN SYNDROME): ICD-10-CM

## 2022-06-23 PROCEDURE — 3008F BODY MASS INDEX DOCD: CPT | Performed by: FAMILY MEDICINE

## 2022-06-23 PROCEDURE — 3078F DIAST BP <80 MM HG: CPT | Performed by: FAMILY MEDICINE

## 2022-06-23 PROCEDURE — 99214 OFFICE O/P EST MOD 30 MIN: CPT | Performed by: FAMILY MEDICINE

## 2022-06-23 PROCEDURE — 3074F SYST BP LT 130 MM HG: CPT | Performed by: FAMILY MEDICINE

## 2022-07-01 ENCOUNTER — OFFICE VISIT (OUTPATIENT)
Dept: OBGYN CLINIC | Facility: CLINIC | Age: 32
End: 2022-07-01
Payer: COMMERCIAL

## 2022-07-01 ENCOUNTER — ULTRASOUND ENCOUNTER (OUTPATIENT)
Dept: OBGYN CLINIC | Facility: CLINIC | Age: 32
End: 2022-07-01
Payer: COMMERCIAL

## 2022-07-01 DIAGNOSIS — D21.9 FIBROIDS: Primary | ICD-10-CM

## 2022-07-01 DIAGNOSIS — D21.9 FIBROIDS: ICD-10-CM

## 2022-07-01 DIAGNOSIS — E28.2 PCOS (POLYCYSTIC OVARIAN SYNDROME): ICD-10-CM

## 2022-07-24 NOTE — TELEPHONE ENCOUNTER
Left VM regarding consult for genetic Counseling  Left call back number
CT negative.  Tolerated PO.  Sleeping comfortably. Anticipatory guidance was given regarding diagnosis(es), expected course, reasons to return for emergent re-evaluation, and home care. Caregiver questions were answered.  The patient was discharged in stable condition.

## 2022-09-29 ENCOUNTER — OFFICE VISIT (OUTPATIENT)
Dept: ORTHOPEDICS CLINIC | Facility: CLINIC | Age: 32
End: 2022-09-29

## 2022-09-29 ENCOUNTER — HOSPITAL ENCOUNTER (OUTPATIENT)
Dept: GENERAL RADIOLOGY | Age: 32
Discharge: HOME | End: 2022-09-29
Attending: PODIATRIST
Payer: COMMERCIAL

## 2022-09-29 DIAGNOSIS — M72.2 PLANTAR FASCIITIS: ICD-10-CM

## 2022-09-29 DIAGNOSIS — M79.671 RIGHT FOOT PAIN: Primary | ICD-10-CM

## 2022-09-29 DIAGNOSIS — M79.671 RIGHT FOOT PAIN: ICD-10-CM

## 2022-09-29 DIAGNOSIS — M79.671 ARCH PAIN OF RIGHT FOOT: ICD-10-CM

## 2022-09-29 PROCEDURE — 99204 OFFICE O/P NEW MOD 45 MIN: CPT | Performed by: PODIATRIST

## 2022-09-29 PROCEDURE — 73630 X-RAY EXAM OF FOOT: CPT | Performed by: PODIATRIST

## 2022-10-04 ENCOUNTER — TELEPHONE (OUTPATIENT)
Dept: PHYSICAL THERAPY | Facility: HOSPITAL | Age: 32
End: 2022-10-04

## 2022-10-05 ENCOUNTER — OFFICE VISIT (OUTPATIENT)
Dept: PHYSICAL THERAPY | Facility: HOSPITAL | Age: 32
End: 2022-10-05
Attending: PODIATRIST
Payer: COMMERCIAL

## 2022-10-05 DIAGNOSIS — M79.671 RIGHT FOOT PAIN: ICD-10-CM

## 2022-10-05 DIAGNOSIS — M79.671 ARCH PAIN OF RIGHT FOOT: ICD-10-CM

## 2022-10-05 DIAGNOSIS — M72.2 PLANTAR FASCIITIS: ICD-10-CM

## 2022-10-05 PROCEDURE — 97110 THERAPEUTIC EXERCISES: CPT

## 2022-10-05 PROCEDURE — 97161 PT EVAL LOW COMPLEX 20 MIN: CPT

## 2022-10-07 ENCOUNTER — OFFICE VISIT (OUTPATIENT)
Dept: PHYSICAL THERAPY | Facility: HOSPITAL | Age: 32
End: 2022-10-07
Attending: PODIATRIST
Payer: COMMERCIAL

## 2022-10-07 PROCEDURE — 97110 THERAPEUTIC EXERCISES: CPT

## 2022-10-07 PROCEDURE — 97140 MANUAL THERAPY 1/> REGIONS: CPT

## 2022-10-13 ENCOUNTER — APPOINTMENT (OUTPATIENT)
Dept: PHYSICAL THERAPY | Facility: HOSPITAL | Age: 32
End: 2022-10-13
Attending: PODIATRIST
Payer: COMMERCIAL

## 2022-10-20 ENCOUNTER — OFFICE VISIT (OUTPATIENT)
Dept: PHYSICAL THERAPY | Facility: HOSPITAL | Age: 32
End: 2022-10-20
Attending: PODIATRIST
Payer: COMMERCIAL

## 2022-10-20 PROCEDURE — 97140 MANUAL THERAPY 1/> REGIONS: CPT

## 2022-10-20 PROCEDURE — 97110 THERAPEUTIC EXERCISES: CPT

## 2022-10-28 ENCOUNTER — OFFICE VISIT (OUTPATIENT)
Dept: PHYSICAL THERAPY | Facility: HOSPITAL | Age: 32
End: 2022-10-28
Attending: PODIATRIST
Payer: COMMERCIAL

## 2022-10-28 PROCEDURE — 97140 MANUAL THERAPY 1/> REGIONS: CPT

## 2022-10-28 PROCEDURE — 97110 THERAPEUTIC EXERCISES: CPT

## 2022-11-01 ENCOUNTER — APPOINTMENT (OUTPATIENT)
Dept: PHYSICAL THERAPY | Facility: HOSPITAL | Age: 32
End: 2022-11-01
Attending: PODIATRIST
Payer: COMMERCIAL

## 2022-11-03 ENCOUNTER — OFFICE VISIT (OUTPATIENT)
Dept: INTERNAL MEDICINE CLINIC | Facility: CLINIC | Age: 32
End: 2022-11-03
Payer: COMMERCIAL

## 2022-11-03 VITALS
WEIGHT: 225 LBS | HEIGHT: 62 IN | SYSTOLIC BLOOD PRESSURE: 135 MMHG | RESPIRATION RATE: 16 BRPM | HEART RATE: 78 BPM | DIASTOLIC BLOOD PRESSURE: 90 MMHG | BODY MASS INDEX: 41.41 KG/M2

## 2022-11-03 DIAGNOSIS — R73.03 PREDIABETES: Primary | ICD-10-CM

## 2022-11-03 DIAGNOSIS — R06.83 SNORING: ICD-10-CM

## 2022-11-03 DIAGNOSIS — Z00.00 ANNUAL PHYSICAL EXAM: ICD-10-CM

## 2022-11-03 PROCEDURE — 99214 OFFICE O/P EST MOD 30 MIN: CPT | Performed by: NURSE PRACTITIONER

## 2022-11-03 PROCEDURE — 3080F DIAST BP >= 90 MM HG: CPT | Performed by: NURSE PRACTITIONER

## 2022-11-03 PROCEDURE — 3008F BODY MASS INDEX DOCD: CPT | Performed by: NURSE PRACTITIONER

## 2022-11-03 PROCEDURE — 3075F SYST BP GE 130 - 139MM HG: CPT | Performed by: NURSE PRACTITIONER

## 2022-11-04 ENCOUNTER — OFFICE VISIT (OUTPATIENT)
Dept: PHYSICAL THERAPY | Facility: HOSPITAL | Age: 32
End: 2022-11-04
Attending: PODIATRIST
Payer: COMMERCIAL

## 2022-11-04 ENCOUNTER — TELEPHONE (OUTPATIENT)
Dept: PHYSICAL THERAPY | Facility: HOSPITAL | Age: 32
End: 2022-11-04

## 2022-11-04 PROCEDURE — 97110 THERAPEUTIC EXERCISES: CPT

## 2022-11-04 PROCEDURE — 97112 NEUROMUSCULAR REEDUCATION: CPT

## 2022-11-04 NOTE — TELEPHONE ENCOUNTER
LVM after pt missed 10am appointment today, 11/4, last scheduled visit. Offered 12:15 appointment today and instructed patient to call scheduling number to add more visits.

## 2022-11-05 ENCOUNTER — LAB ENCOUNTER (OUTPATIENT)
Dept: LAB | Facility: HOSPITAL | Age: 32
End: 2022-11-05
Attending: NURSE PRACTITIONER
Payer: COMMERCIAL

## 2022-11-05 DIAGNOSIS — R73.03 PREDIABETES: ICD-10-CM

## 2022-11-05 DIAGNOSIS — Z00.00 ANNUAL PHYSICAL EXAM: ICD-10-CM

## 2022-11-05 LAB
ALBUMIN SERPL-MCNC: 3.4 G/DL (ref 3.4–5)
ALBUMIN/GLOB SERPL: 0.9 {RATIO} (ref 1–2)
ALP LIVER SERPL-CCNC: 87 U/L
ALT SERPL-CCNC: 31 U/L
ANION GAP SERPL CALC-SCNC: 7 MMOL/L (ref 0–18)
AST SERPL-CCNC: 17 U/L (ref 15–37)
BASOPHILS # BLD AUTO: 0.06 X10(3) UL (ref 0–0.2)
BASOPHILS NFR BLD AUTO: 0.8 %
BILIRUB SERPL-MCNC: 0.9 MG/DL (ref 0.1–2)
BUN BLD-MCNC: 9 MG/DL (ref 7–18)
BUN/CREAT SERPL: 12.3 (ref 10–20)
CALCIUM BLD-MCNC: 9 MG/DL (ref 8.5–10.1)
CHLORIDE SERPL-SCNC: 107 MMOL/L (ref 98–112)
CHOLEST SERPL-MCNC: 156 MG/DL (ref ?–200)
CO2 SERPL-SCNC: 25 MMOL/L (ref 21–32)
CREAT BLD-MCNC: 0.73 MG/DL
DEPRECATED RDW RBC AUTO: 45.6 FL (ref 35.1–46.3)
EOSINOPHIL # BLD AUTO: 0.25 X10(3) UL (ref 0–0.7)
EOSINOPHIL NFR BLD AUTO: 3.3 %
ERYTHROCYTE [DISTWIDTH] IN BLOOD BY AUTOMATED COUNT: 15 % (ref 11–15)
EST. AVERAGE GLUCOSE BLD GHB EST-MCNC: 105 MG/DL (ref 68–126)
FASTING PATIENT LIPID ANSWER: YES
FASTING STATUS PATIENT QL REPORTED: YES
GFR SERPLBLD BASED ON 1.73 SQ M-ARVRAT: 113 ML/MIN/1.73M2 (ref 60–?)
GLOBULIN PLAS-MCNC: 3.9 G/DL (ref 2.8–4.4)
GLUCOSE BLD-MCNC: 83 MG/DL (ref 70–99)
HBA1C MFR BLD: 5.3 % (ref ?–5.7)
HCT VFR BLD AUTO: 40.2 %
HDLC SERPL-MCNC: 43 MG/DL (ref 40–59)
HGB BLD-MCNC: 13.2 G/DL
IMM GRANULOCYTES # BLD AUTO: 0.04 X10(3) UL (ref 0–1)
IMM GRANULOCYTES NFR BLD: 0.5 %
INSULIN SERPL-ACNC: 12.8 MU/L (ref 3–25)
LDLC SERPL CALC-MCNC: 97 MG/DL (ref ?–100)
LYMPHOCYTES # BLD AUTO: 2.51 X10(3) UL (ref 1–4)
LYMPHOCYTES NFR BLD AUTO: 33.5 %
MCH RBC QN AUTO: 28 PG (ref 26–34)
MCHC RBC AUTO-ENTMCNC: 32.8 G/DL (ref 31–37)
MCV RBC AUTO: 85.4 FL
MONOCYTES # BLD AUTO: 0.38 X10(3) UL (ref 0.1–1)
MONOCYTES NFR BLD AUTO: 5.1 %
NEUTROPHILS # BLD AUTO: 4.25 X10 (3) UL (ref 1.5–7.7)
NEUTROPHILS # BLD AUTO: 4.25 X10(3) UL (ref 1.5–7.7)
NEUTROPHILS NFR BLD AUTO: 56.8 %
NONHDLC SERPL-MCNC: 113 MG/DL (ref ?–130)
OSMOLALITY SERPL CALC.SUM OF ELEC: 286 MOSM/KG (ref 275–295)
PLATELET # BLD AUTO: 401 10(3)UL (ref 150–450)
POTASSIUM SERPL-SCNC: 4.3 MMOL/L (ref 3.5–5.1)
PROT SERPL-MCNC: 7.3 G/DL (ref 6.4–8.2)
RBC # BLD AUTO: 4.71 X10(6)UL
SODIUM SERPL-SCNC: 139 MMOL/L (ref 136–145)
TRIGL SERPL-MCNC: 85 MG/DL (ref 30–149)
TSI SER-ACNC: 2.55 MIU/ML (ref 0.36–3.74)
VLDLC SERPL CALC-MCNC: 14 MG/DL (ref 0–30)
WBC # BLD AUTO: 7.5 X10(3) UL (ref 4–11)

## 2022-11-05 PROCEDURE — 84443 ASSAY THYROID STIM HORMONE: CPT

## 2022-11-05 PROCEDURE — 36415 COLL VENOUS BLD VENIPUNCTURE: CPT

## 2022-11-05 PROCEDURE — 80061 LIPID PANEL: CPT

## 2022-11-05 PROCEDURE — 83036 HEMOGLOBIN GLYCOSYLATED A1C: CPT

## 2022-11-05 PROCEDURE — 83525 ASSAY OF INSULIN: CPT

## 2022-11-05 PROCEDURE — 80053 COMPREHEN METABOLIC PANEL: CPT

## 2022-11-05 PROCEDURE — 85025 COMPLETE CBC W/AUTO DIFF WBC: CPT

## 2022-11-09 ENCOUNTER — APPOINTMENT (OUTPATIENT)
Dept: PHYSICAL THERAPY | Facility: HOSPITAL | Age: 32
End: 2022-11-09
Attending: PODIATRIST
Payer: COMMERCIAL

## 2022-11-10 ENCOUNTER — OFFICE VISIT (OUTPATIENT)
Dept: ORTHOPEDICS CLINIC | Facility: CLINIC | Age: 32
End: 2022-11-10
Payer: COMMERCIAL

## 2022-11-10 VITALS — HEIGHT: 62 IN | WEIGHT: 225 LBS | BODY MASS INDEX: 41.41 KG/M2

## 2022-11-10 DIAGNOSIS — M79.671 RIGHT FOOT PAIN: Primary | ICD-10-CM

## 2022-11-10 DIAGNOSIS — M79.671 ARCH PAIN OF RIGHT FOOT: ICD-10-CM

## 2022-11-10 DIAGNOSIS — M72.2 PLANTAR FASCIITIS: ICD-10-CM

## 2022-11-10 PROCEDURE — 3008F BODY MASS INDEX DOCD: CPT | Performed by: PODIATRIST

## 2022-11-10 PROCEDURE — 99213 OFFICE O/P EST LOW 20 MIN: CPT | Performed by: PODIATRIST

## 2022-11-11 ENCOUNTER — TELEPHONE (OUTPATIENT)
Dept: PHYSICAL THERAPY | Facility: HOSPITAL | Age: 32
End: 2022-11-11

## 2022-11-11 ENCOUNTER — APPOINTMENT (OUTPATIENT)
Dept: PHYSICAL THERAPY | Facility: HOSPITAL | Age: 32
End: 2022-11-11
Attending: PODIATRIST
Payer: COMMERCIAL

## 2022-11-11 NOTE — TELEPHONE ENCOUNTER
LVM offering 12:15 appointment today, 11/11. Suggested patient call scheduling to get more appointments added with myself of Chela Uriarte in next few weeks.  Gave call center #

## 2022-11-23 ENCOUNTER — APPOINTMENT (OUTPATIENT)
Dept: PHYSICAL THERAPY | Facility: HOSPITAL | Age: 32
End: 2022-11-23
Attending: PODIATRIST
Payer: COMMERCIAL

## 2022-12-07 ENCOUNTER — OFFICE VISIT (OUTPATIENT)
Dept: ENDOCRINOLOGY CLINIC | Facility: CLINIC | Age: 32
End: 2022-12-07
Payer: COMMERCIAL

## 2022-12-07 VITALS
DIASTOLIC BLOOD PRESSURE: 92 MMHG | SYSTOLIC BLOOD PRESSURE: 122 MMHG | WEIGHT: 225 LBS | HEART RATE: 92 BPM | BODY MASS INDEX: 41 KG/M2

## 2022-12-07 DIAGNOSIS — E28.2 PCOS (POLYCYSTIC OVARIAN SYNDROME): Primary | ICD-10-CM

## 2022-12-07 DIAGNOSIS — E16.2 HYPOGLYCEMIA: ICD-10-CM

## 2022-12-07 DIAGNOSIS — R73.03 PREDIABETES: ICD-10-CM

## 2022-12-07 LAB
GLUCOSE BLOOD: 113
TEST STRIP LOT #: NORMAL NUMERIC

## 2022-12-07 PROCEDURE — 82947 ASSAY GLUCOSE BLOOD QUANT: CPT | Performed by: NURSE PRACTITIONER

## 2022-12-07 PROCEDURE — 3080F DIAST BP >= 90 MM HG: CPT | Performed by: NURSE PRACTITIONER

## 2022-12-07 PROCEDURE — 3074F SYST BP LT 130 MM HG: CPT | Performed by: NURSE PRACTITIONER

## 2022-12-07 PROCEDURE — 99204 OFFICE O/P NEW MOD 45 MIN: CPT | Performed by: NURSE PRACTITIONER

## 2022-12-07 NOTE — PATIENT INSTRUCTIONS
A1C: 5.3% on 11/5/2022  Blood glucose: 113 in clinic today    Medications:   None     -Closely monitor blood glucose readings via continuous glucometer   -keep a nutrition/symptom log while sensor is on  -have labs drawn between 7-8am fasting (have these done around 1 week prior to follow up visit)       Weight:  Wt Readings from Last 6 Encounters:  12/07/22 : 225 lb (102.1 kg)  11/10/22 : 225 lb (102.1 kg)  11/03/22 : 225 lb (102.1 kg)  06/23/22 : 218 lb (98.9 kg)  06/17/22 : 218 lb 6.4 oz (99.1 kg)  04/06/22 : 217 lb 9.5 oz (98.7 kg)

## 2022-12-17 ENCOUNTER — LAB ENCOUNTER (OUTPATIENT)
Dept: LAB | Facility: HOSPITAL | Age: 32
End: 2022-12-17
Attending: NURSE PRACTITIONER
Payer: COMMERCIAL

## 2022-12-17 DIAGNOSIS — E28.2 PCOS (POLYCYSTIC OVARIAN SYNDROME): ICD-10-CM

## 2022-12-17 DIAGNOSIS — R73.03 PREDIABETES: ICD-10-CM

## 2022-12-17 LAB
CORTIS SERPL-MCNC: 7.9 UG/DL
DHEA-S SERPL-MCNC: 166.4 UG/DL
INSULIN SERPL-ACNC: 28.8 MU/L (ref 3–25)
PROLACTIN SERPL-MCNC: 34.8 NG/ML

## 2022-12-17 PROCEDURE — 84403 ASSAY OF TOTAL TESTOSTERONE: CPT

## 2022-12-17 PROCEDURE — 83525 ASSAY OF INSULIN: CPT

## 2022-12-17 PROCEDURE — 82533 TOTAL CORTISOL: CPT

## 2022-12-17 PROCEDURE — 36415 COLL VENOUS BLD VENIPUNCTURE: CPT

## 2022-12-17 PROCEDURE — 84681 ASSAY OF C-PEPTIDE: CPT

## 2022-12-17 PROCEDURE — 84146 ASSAY OF PROLACTIN: CPT

## 2022-12-17 PROCEDURE — 82627 DEHYDROEPIANDROSTERONE: CPT

## 2022-12-17 PROCEDURE — 84402 ASSAY OF FREE TESTOSTERONE: CPT

## 2022-12-19 LAB — C-PEPTIDE, SERUM OR PLASMA: 3.9 NG/ML

## 2022-12-22 ENCOUNTER — TELEPHONE (OUTPATIENT)
Dept: ENDOCRINOLOGY CLINIC | Facility: CLINIC | Age: 32
End: 2022-12-22

## 2022-12-22 NOTE — TELEPHONE ENCOUNTER
Pt called to speak to RN about coming in to office to get Li sensor replaced. See also patient message. Please call.

## 2022-12-23 ENCOUNTER — PATIENT MESSAGE (OUTPATIENT)
Dept: ORTHOPEDICS CLINIC | Facility: CLINIC | Age: 32
End: 2022-12-23

## 2022-12-23 NOTE — TELEPHONE ENCOUNTER
Please call patient - she cancelled visit yesterday likely due to weather. Lets reschedule aliyah Pro placement for first week of January. Schedule MD visit 2 weeks after aliyah pro - ok to use Res 24. Thanks.

## 2022-12-23 NOTE — TELEPHONE ENCOUNTER
From: Azalia Starr  To: Kay Conley. Luis Alcantar DPM  Sent: 12/23/2022 12:17 PM CST  Subject: Need order for orthopedics by December 28th    Hi, I was told to go get orthopedics at [facility] by Dr. Luis Alcantar. It was written in my post visit note and she told me verbally. I went to the [facility] in [place] and got fitted. They told me the document I had did not count. I called your office and asked them to send an order and they said they would. [facility] also sent a fax requesting an order, but just informed me no order has been sent back. My appointment to  my new orthopedics is on December 28th. I would greatly appreciate if an order could be sent by that time so I can pick them up by the end of the year because I have already been deductible. They said any order signed electronically or by hand by the doctor will work. Thank you. Happy holidays.

## 2022-12-26 LAB
TESTOSTERONE, FREE, S: 0.68 NG/DL
TESTOSTERONE, TOTAL, S: 16 NG/DL

## 2022-12-28 NOTE — TELEPHONE ENCOUNTER
LM to call clinic to schedule apt for [facility] pro placement and f/u with Dr. Dalton Barrera

## 2023-01-19 ENCOUNTER — TELEPHONE (OUTPATIENT)
Dept: ENDOCRINOLOGY CLINIC | Facility: CLINIC | Age: 33
End: 2023-01-19

## 2023-01-25 ENCOUNTER — PATIENT MESSAGE (OUTPATIENT)
Dept: INTERNAL MEDICINE CLINIC | Facility: CLINIC | Age: 33
End: 2023-01-25

## 2023-01-25 ENCOUNTER — HOSPITAL ENCOUNTER (OUTPATIENT)
Age: 33
Discharge: LEFT BEFORE DISCHARGE | End: 2023-01-25
Payer: COMMERCIAL

## 2023-01-25 NOTE — ED QUICK NOTES
Pt states she was exposed to rsv and wanting a rsv testing, pt has cold symptoms for 2 days, advised limitations of this IC, options given, pt left without being seen

## 2023-01-26 ENCOUNTER — OFFICE VISIT (OUTPATIENT)
Dept: INTERNAL MEDICINE CLINIC | Facility: CLINIC | Age: 33
End: 2023-01-26

## 2023-01-26 ENCOUNTER — NURSE TRIAGE (OUTPATIENT)
Dept: INTERNAL MEDICINE CLINIC | Facility: CLINIC | Age: 33
End: 2023-01-26

## 2023-01-26 VITALS
BODY MASS INDEX: 42.51 KG/M2 | WEIGHT: 231 LBS | HEIGHT: 62 IN | DIASTOLIC BLOOD PRESSURE: 90 MMHG | HEART RATE: 87 BPM | SYSTOLIC BLOOD PRESSURE: 126 MMHG

## 2023-01-26 DIAGNOSIS — B34.9 VIRAL SYNDROME: Primary | ICD-10-CM

## 2023-01-26 PROCEDURE — 3080F DIAST BP >= 90 MM HG: CPT | Performed by: NURSE PRACTITIONER

## 2023-01-26 PROCEDURE — 3074F SYST BP LT 130 MM HG: CPT | Performed by: NURSE PRACTITIONER

## 2023-01-26 PROCEDURE — 99213 OFFICE O/P EST LOW 20 MIN: CPT | Performed by: NURSE PRACTITIONER

## 2023-01-26 PROCEDURE — 3008F BODY MASS INDEX DOCD: CPT | Performed by: NURSE PRACTITIONER

## 2023-01-26 NOTE — TELEPHONE ENCOUNTER
From: Bernadette Peabody  To: Tammy Rudd MD  Sent: 1/25/2023 2:16 PM CST  Subject: RSV    Hi, I am a patient of Dr. Andrae Larkin. I had an exposure to a person with RSV and am now developing potential RSV symptoms- runny nose, headache, sneezing, coughing. 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 Thank you!

## 2023-01-26 NOTE — TELEPHONE ENCOUNTER
Hi, I am a patient of Dr. Lori Richey. I had an exposure to a person with RSV and am now developing potential RSV symptoms- runny nose, headache, sneezing, coughing. 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 Thank you  My chart message sent.

## 2023-01-27 ENCOUNTER — TELEPHONE (OUTPATIENT)
Dept: INTERNAL MEDICINE CLINIC | Facility: CLINIC | Age: 33
End: 2023-01-27

## 2023-01-27 NOTE — TELEPHONE ENCOUNTER
2nd attempt. Try to call patient to inform patient we found the CPT code and that's [address]21 and they charge $588 but it all depends on the insurance how much she will pay out of pocket. Left a VM to call back if she still was the swab.

## 2023-01-27 NOTE — TELEPHONE ENCOUNTER
Try to call patient today to inform patient about the CPT code and how much they charge for the RSV COVID swab. Left a VM to call back.

## 2023-01-30 NOTE — TELEPHONE ENCOUNTER
3rd attempt. Try to call patient to inform patient we found the CPT code and that's 71940 and they charge $588 but it all depends on the insurance how much she will pay out of pocket. Left a VM to call back if she still was the swab.

## 2023-02-28 ENCOUNTER — OFFICE VISIT (OUTPATIENT)
Dept: FAMILY MEDICINE CLINIC | Facility: CLINIC | Age: 33
End: 2023-02-28
Payer: COMMERCIAL

## 2023-02-28 VITALS
TEMPERATURE: 97 F | HEART RATE: 92 BPM | SYSTOLIC BLOOD PRESSURE: 122 MMHG | WEIGHT: 225 LBS | DIASTOLIC BLOOD PRESSURE: 71 MMHG | BODY MASS INDEX: 39.87 KG/M2 | OXYGEN SATURATION: 96 % | RESPIRATION RATE: 18 BRPM | HEIGHT: 63 IN

## 2023-02-28 DIAGNOSIS — Z20.818 STREPTOCOCCUS EXPOSURE: ICD-10-CM

## 2023-02-28 DIAGNOSIS — J02.9 SORE THROAT: Primary | ICD-10-CM

## 2023-02-28 LAB
CONTROL LINE PRESENT WITH A CLEAR BACKGROUND (YES/NO): YES YES/NO
STREP GRP A CUL-SCR: NEGATIVE

## 2023-02-28 PROCEDURE — 3078F DIAST BP <80 MM HG: CPT | Performed by: NURSE PRACTITIONER

## 2023-02-28 PROCEDURE — 87081 CULTURE SCREEN ONLY: CPT | Performed by: NURSE PRACTITIONER

## 2023-02-28 PROCEDURE — 3074F SYST BP LT 130 MM HG: CPT | Performed by: NURSE PRACTITIONER

## 2023-02-28 PROCEDURE — 99213 OFFICE O/P EST LOW 20 MIN: CPT | Performed by: NURSE PRACTITIONER

## 2023-02-28 PROCEDURE — 3008F BODY MASS INDEX DOCD: CPT | Performed by: NURSE PRACTITIONER

## 2023-02-28 PROCEDURE — 87880 STREP A ASSAY W/OPTIC: CPT | Performed by: NURSE PRACTITIONER

## 2023-02-28 NOTE — PATIENT INSTRUCTIONS
If we send out a throat culture, we will contact you with the results in 48-72 hours. If positive, then we will call in an appropriate antibiotic. If negative, then the sore throat is most likely viral in origin and should resolve within 7-10 days. Comfort measures explained and discussed:    OTC Tylenol/ibuprofen as needed. Push fluids- warm or cool liquids, whichever is soothing for patient. Avoid caffeine. Do not share utensils or drinks with anyone. Good handwashing. Get plenty of rest.    Can use over the counter benzocaine such as Cepacol throat lozenges or Chloroseptic throat spray to soothe sore throat. Warm salt water gargles 2-3 times daily for at least 3 days. If strep test is results are positive: Take the full course of your antibiotic even if you are feeling better. You are considered to be contagious until you have been on antibiotics for 24 hours. You can return to school and/or work once on antibiotics for 24 hours  Change tooth brush two days into therapy  Follow up in 3-5 days if not improving, condition worsens, or fever greater than or equal to 100.4 persists for 72 hours. Follow up in 3-5 days if not improving, condition worsens, or fever greater than or equal to 100.4 persists for 72 hours.

## 2023-05-03 NOTE — PROGRESS NOTES
[facility] Progress Note    Subjective:     Pt here for right arch pain follow up  Now left arch hurting  She has been through several sessions of physical therapy and it is helpful but she still having discomfort. Occasionally in the heel but mainly in the arches. She has changed some of her shoes but would like some recommendations. She is awaiting her appointment at [facility] for orthotics next month        Objective:     Exam tenderness into the medial arch area along the medial fibers of the plantar fascia. No tear no bruising or swelling. No heel pain today. Over pronation in gait with maintenance of a neutral arch                  Assessment/Plan:     Diagnoses and all orders for this visit:    Right foot pain    Arch pain of right foot    Plantar fasciitis        She is slowly improving but I do want her to continue PT  Discussed the type of shoes she should be in and I gave her some ideas  Await the orthotics  Follow-up at this point is as needed            April Wang. Eric Prado DPM  [facility]    [employer] speech recognition software was used to prepare this note. If a word or phrase is confusing, it is likely do to a failure of recognition. Please contact me with any questions or clarifications.
New onset atrial fibrillation

## 2023-07-26 ENCOUNTER — TELEPHONE (OUTPATIENT)
Dept: OBGYN CLINIC | Facility: CLINIC | Age: 33
End: 2023-07-26

## 2023-07-26 NOTE — TELEPHONE ENCOUNTER
LVM notifying the pt that Dr. Juan Smallwood will not be available until later in the afternoon.      Cancelled pt appt

## 2023-08-04 ENCOUNTER — OFFICE VISIT (OUTPATIENT)
Dept: SURGERY | Facility: CLINIC | Age: 33
End: 2023-08-04
Payer: COMMERCIAL

## 2023-08-04 VITALS
WEIGHT: 225.63 LBS | DIASTOLIC BLOOD PRESSURE: 80 MMHG | BODY MASS INDEX: 41.52 KG/M2 | HEIGHT: 62 IN | SYSTOLIC BLOOD PRESSURE: 118 MMHG

## 2023-08-04 DIAGNOSIS — D21.9 FIBROIDS: Primary | ICD-10-CM

## 2023-08-04 PROCEDURE — 3079F DIAST BP 80-89 MM HG: CPT | Performed by: OBSTETRICS & GYNECOLOGY

## 2023-08-04 PROCEDURE — 99213 OFFICE O/P EST LOW 20 MIN: CPT | Performed by: OBSTETRICS & GYNECOLOGY

## 2023-08-04 PROCEDURE — 3008F BODY MASS INDEX DOCD: CPT | Performed by: OBSTETRICS & GYNECOLOGY

## 2023-08-04 PROCEDURE — 3074F SYST BP LT 130 MM HG: CPT | Performed by: OBSTETRICS & GYNECOLOGY

## 2023-08-04 RX ORDER — FLUOXETINE 10 MG/1
10 CAPSULE ORAL DAILY
COMMUNITY
Start: 2023-04-14

## 2023-10-18 ENCOUNTER — OFFICE VISIT (OUTPATIENT)
Dept: SLEEP CENTER | Age: 33
End: 2023-10-18
Attending: NURSE PRACTITIONER
Payer: COMMERCIAL

## 2023-10-18 DIAGNOSIS — R06.83 SNORING: ICD-10-CM

## 2023-10-18 PROCEDURE — 95806 SLEEP STUDY UNATT&RESP EFFT: CPT

## 2023-10-20 NOTE — PROCEDURES
[address] [facility]  Accredited by the [employer] (AASM)    PATIENT'S NAME: Fernandez Sethi   ATTENDING PHYSICIAN: Maribel Robles DO   REFERRING PHYSICIAN: MARQUES Vo   PATIENT ACCOUNT #: [de-identified] LOCATION: [facility] #: [id number] YOB: 1990   DATE OF STUDY: 10/18/2023       SLEEP STUDY REPORT      STUDY TYPE:  Home sleep test.    INDICATION:  Suspected obstructive sleep apnea (ICD-10 code G47.33), in a patient with snoring, witnessed apneic events, excessive daytime somnolence, body mass index 39.3. RESULTS:  The patient underwent home sleep test with measurement of her nasal airflow, nasal air pressure, snoring, chest and abdominal wall motion, oximetry, and body position. I have reviewed the entirety of the raw data of this study. During the study, the total recording time is 474 minutes. The lights-out clock time is 11:20 p.m.; the lights-on clock time is 7:15 a.m. The apnea plus hypopnea index is 3.5 events per hour, and this perla to 7.9 events per hour in the supine position. The average oxygen saturation is 97%, the lowest oxygen saturation is 92%, and the patient spent 0.2% of the testing with saturations 88% or less. The average heart rate was 64 beats per minute. The patient spent approximately 25% of the test in the supine position. INTERPRETATION:  The data generated from this study shows no evidence of significant sleep-disordered breathing (ICD-10 code G47.33). RECOMMENDATIONS:  1. Clinical correlation is required. 2.   Evaluate for etiologies of patient's excessive daytime sleepiness. 3.   Consider extending sleep time by 1 to 2 hours to evaluate for impact on daytime sleepiness. 4.   If narcolepsy is a diagnostic consideration, could consider MSLT. 5.   Weight loss. 6.   Avoid alcohol and sedating drug. 7.   Patient should not drive if at all sleepy.   8.   Screen for hypothyroidism if not already performed. Please do not hesitate to contact me if there is any question whatsoever regarding interpretation of this study.     Dictated By Hernando Woodard MD  d: 10/19/2023 19:30:31  t: 10/19/2023 19:41:50  [facility] [id number]  CAROLINE/    cc: DO Prakash Rockwell APRN

## 2024-03-05 ENCOUNTER — OFFICE VISIT (OUTPATIENT)
Dept: OBGYN CLINIC | Facility: CLINIC | Age: 34
End: 2024-03-05
Payer: COMMERCIAL

## 2024-03-05 VITALS — DIASTOLIC BLOOD PRESSURE: 64 MMHG | BODY MASS INDEX: 42 KG/M2 | SYSTOLIC BLOOD PRESSURE: 110 MMHG | WEIGHT: 232 LBS

## 2024-03-05 DIAGNOSIS — N92.4 EXCESSIVE BLEEDING IN PREMENOPAUSAL PERIOD: ICD-10-CM

## 2024-03-05 DIAGNOSIS — D25.1 INTRAMURAL LEIOMYOMA OF UTERUS: Primary | ICD-10-CM

## 2024-03-05 PROCEDURE — 3074F SYST BP LT 130 MM HG: CPT | Performed by: OBSTETRICS & GYNECOLOGY

## 2024-03-05 PROCEDURE — 3078F DIAST BP <80 MM HG: CPT | Performed by: OBSTETRICS & GYNECOLOGY

## 2024-03-05 PROCEDURE — 99214 OFFICE O/P EST MOD 30 MIN: CPT | Performed by: OBSTETRICS & GYNECOLOGY

## 2024-03-05 NOTE — PROGRESS NOTES
New Patient GYN History and Physical  [facility]    CHIEF COMPLAINT:    Chief Complaint   Patient presents with    Consult     fibroids   HISTORY OF PRESENT ILLNESS:   Keesha Burrell is a 33 year old female   who presents for fibroids.  Has h/o fibroids - feels like her uterus is getting larger.    Had myomectomy in 2022 - had surgery with Dr. Sampson Cornejo.    Last US was summer 2022    Her understanding is that she tends towards higher estrogen (PCOS-type), which helps the fibroids grow.    Patient's last menstrual period was 2024 (exact date).  Was getting them regularly  After surgery, they were about q 30d, has varied a little from 26-36 days.    Typically bleeding 6-7 days, heavy/heavy/medium/light/medium/medium, + cramps start with the bleeding (ibuprofen makes it manageable) doesn't usually have to miss work.    No sex currently - no penetrative sex (penis)    G0, wants to maintain option for pregnancy in the future.      PAST MEDICAL HISTORY:   Past Medical History:   Diagnosis Date    Allergic rhinitis     Anxiety     Asthma (HCC)     Depression     Dysmenorrhea     Fibroids     Hormone disorder     high estrogen, possible pcos    Obesity     PCOS (polycystic ovarian syndrome)         PAST SURGICAL HISTORY:   Past Surgical History:   Procedure Laterality Date    Appendectomy      Laparoscopy, surg, myomectomy; 1-4 intramural myomas, total wt 250 gms, &/or remove surface myomas  2022    PROCEDURE:  Examination under anesthesia; hysteroscopy; operative laparoscopy; lysis of severe right periadnexal adhesions, status post appendectomy; multiple myomectomy x4; morcellation in a containment system.    Other      WISDOM TEETH        PAST OB HISTORY:  OB History    Para Term  AB Living   0 0 0 0 0 0   SAB IAB Ectopic Multiple Live Births   0 0 0 0 0       CURRENT MEDICATIONS:      Current Outpatient Medications:     metFORMIN 500 MG Oral Tab, Take 1 tablet (500 mg  total) by mouth 3 (three) times daily., Disp: , Rfl:     Loratadine (CLARITIN OR), , Disp: , Rfl:     FLUoxetine 10 MG Oral Cap, Take 1 capsule (10 mg total) by mouth daily., Disp: , Rfl:     omega-3 fatty acids 1000 MG Oral Cap, Take 1,000 mg by mouth 2 (two) times daily., Disp: , Rfl:     FLUoxetine 20 MG Oral Cap, Take 1 capsule (20 mg total) by mouth daily., Disp: , Rfl:     Albuterol Sulfate  (90 Base) MCG/ACT Inhalation Aero Soln, Inhale 2 puffs into the lungs., Disp: , Rfl:     Fluticasone Propionate 50 MCG/ACT Nasal Suspension, 2 sprays by Nasal route daily., Disp: , Rfl:     SPIRONOLACTONE 25 MG Oral Tab, TAKE 1 TABLET(25 MG) BY MOUTH TWICE DAILY, Disp: 180 tablet, Rfl: 0    ALLERGIES:  Allergies   Allergen Reactions    Peanuts HIVES    Penicillins HIVES and RASH     As a child rash       Ceclor [Ceclor] RASH    Egg HIVES    Cefaclor RASH    Penicillins RASH     A a baby       SOCIAL HISTORY:  Social History     Socioeconomic History    Marital status: Single   Tobacco Use    Smoking status: Never    Smokeless tobacco: Never    Tobacco comments:     none   Vaping Use    Vaping Use: Never used   Substance and Sexual Activity    Alcohol use: Yes     Alcohol/week: 1.0 standard drink of alcohol     Types: 1 Standard drinks or equivalent per week     Comment: socially 3 drinks per month    Drug use: Not Currently     Frequency: 0.5 times per week     Types: Cannabis   Other Topics Concern    Caffeine Concern No    Exercise Yes     Comment: 1-2 times per week    Seat Belt Yes    Special Diet Yes     Comment: avoid dairy, peanuts, soy, wheat, egg whites    Stress Concern No    Weight Concern Yes       FAMILY HISTORY:  Family History   Problem Relation Age of Onset    No Known Problems Father     No Known Problems Mother     Heart Disorder Maternal Grandfather     Hypertension Maternal Grandfather     Obesity Maternal Grandfather     Stroke Maternal Grandfather     Obesity Paternal Grandmother     Obesity  Paternal Grandfather     Breast Cancer Maternal Aunt     Breast Cancer Maternal Aunt     No Known Problems Brother     No Known Problems Brother     Endometriosis Sister      ASSESSMENTS:  PHYSICAL EXAM:   Patient's last menstrual period was 02/22/2024 (exact date).   Vitals:    03/05/24 1145   BP: 110/64   Weight: 232 lb (105.2 kg)     CONSTITUTIONAL: Awake, alert, cooperative, no apparent distress, and appears stated age   NECK: Supple, symmetrical, trachea midline, no adenopathy, thyroid symmetric, not enlarged and no tenderness  LUNGS: No excess work of breathing  ABDOMEN: Soft, non-distended, non-tender, no masses palpated    GENITAL/URINARY:    External Genitalia:  General appearance; normal, Hair distribution; normal, Lesions absent   Urethral Meatus:  Lesions absent, Prolapse absent  Bladder:  Tenderness absent, Cystocele absent  Vagina:  Discharge absent, Lesions absent, Pelvic support normal  Cervix:  Lesions absent, Discharge absent, Tenderness absent  Uterus:  Size difficult to assess, Masses absent, Tenderness absent  Adnexa:  Masses absent, Tenderness absent  Anus/Perineum:  Lesions absent    MUSCULOSKELETAL: There is no redness, warmth, or swelling of the joints. Tone is normal.  NEUROLOGIC: Patient is awake, alert and oriented to name, place and time. Casual gait is normal.  SKIN: no bruising or bleeding and no rashes  PSYCHIATRIC: Behavior:  Appropriate  Mood:  appropriate    Last pelvic US: 7/2022  Small fibroid shadows are noted in the uterus. The largest is located in the posterior aspect of the uterus and measures 3.2 x 2.3 cm.     The endometrial width and cavity fall within a normal range.     Both ovaries appear normal in size and echo pattern.     ASSESSMENT AND PLAN:  1. Intramural leiomyoma of uterus  - plan to get repeat US  - we discussed option for acessa procedure to treat fibroids. She understands that there is no safety data for pregnancy after acessa. She also understands need for  the US before we plan / schedule any surgeries.  - Pelvic US Complete [facility] Only [[id number]]; Future    2. Excessive bleeding in premenopausal period  - see above, US to assess uterus, then f/u after.  - Pelvic US Complete [facility] Only [[id number]]; Future     follow up as needed  Fariha Poe, DO

## 2024-04-05 ENCOUNTER — ULTRASOUND ENCOUNTER (OUTPATIENT)
Dept: OBGYN CLINIC | Facility: CLINIC | Age: 34
End: 2024-04-05
Payer: COMMERCIAL

## 2024-04-05 DIAGNOSIS — D25.1 INTRAMURAL LEIOMYOMA OF UTERUS: ICD-10-CM

## 2024-04-05 DIAGNOSIS — N92.4 EXCESSIVE BLEEDING IN PREMENOPAUSAL PERIOD: ICD-10-CM

## 2024-04-26 ENCOUNTER — ORDER TRANSCRIPTION (OUTPATIENT)
Dept: SLEEP CENTER | Age: 34
End: 2024-04-26

## (undated) DEVICE — SOL  .9 1000ML BTL

## (undated) DEVICE — TROCAR: Brand: KII FIOS FIRST ENTRY

## (undated) DEVICE — SOL LACT RINGERS 1000ML

## (undated) DEVICE — LIGHT HANDLE

## (undated) DEVICE — SCD SLEEVE KNEE HI BLEND

## (undated) DEVICE — VISUALIZATION SYSTEM: Brand: CLEARIFY

## (undated) DEVICE — STERILE POLYISOPRENE POWDER-FREE SURGICAL GLOVES: Brand: PROTEXIS

## (undated) DEVICE — HARMONIC 1100 SHEARS, 36CM SHAFT LENGTH: Brand: HARMONIC

## (undated) DEVICE — ADHESIVE MASTISOL 2/3CC VL

## (undated) DEVICE — SOL  .9 3000ML

## (undated) DEVICE — SUTURE VLOC 180 3-0 9" 0344

## (undated) DEVICE — 40580 - THE PINK PAD - ADVANCED TRENDELENBURG POSITIONING KIT: Brand: 40580 - THE PINK PAD - ADVANCED TRENDELENBURG POSITIONING KIT

## (undated) DEVICE — 3M™ STERI-STRIP™ REINFORCED ADHESIVE SKIN CLOSURES, R1547, 1/2 IN X 4 IN (12 MM X 100 MM), 6 STRIPS/ENVELOPE: Brand: 3M™ STERI-STRIP™

## (undated) DEVICE — INSUFFLATION NEEDLE TO ESTABLISH PNEUMOPERITONEUM.: Brand: INSUFFLATION NEEDLE

## (undated) DEVICE — SUTURE VICRYL 0 UR-6

## (undated) DEVICE — GYN LAP/ROBOTIC: Brand: MEDLINE INDUSTRIES, INC.

## (undated) DEVICE — 3M(TM) TEGADERM(TM) TRANSPARENT FILM DRESSING FRAME STYLE 9505W: Brand: 3M™ TEGADERM™

## (undated) DEVICE — SUTURE MONOCRYL 4-0 PS-2

## (undated) DEVICE — PLUMEPORT ACTIV LAPAROSCOPIC SMOKE FILTRATION DEVICE: Brand: PLUMEPORT ACTIVE

## (undated) DEVICE — CUSH POSI ELBO ULN NRV 22X11X8

## (undated) DEVICE — SOLUTION SURG DURA PREP HAZMAT

## (undated) DEVICE — TROCAR: Brand: KII® SLEEVE

## (undated) DEVICE — INFLOWHYSTER S&N

## (undated) DEVICE — HARMONIC CURVED BLADES 5MM: Brand: HARMONIC

## (undated) DEVICE — LAPAROSCOPIC TISSUE RETRIEVAL SAC FOR USE WITH MINIMALLY INVASIVE PROCEDURES: Brand: ESPINER TISSUE RETRIEVAL SYSTEM

## (undated) DEVICE — NEEDLE SPINAL 18X6 408360

## (undated) DEVICE — BAG DRAIN INFECTION CNTRL 2000

## (undated) DEVICE — TROCAR: Brand: KII SHIELDED BLADED ACCESS SYSTEM

## (undated) DEVICE — [HIGH FLOW INSUFFLATOR,  DO NOT USE IF PACKAGE IS DAMAGED,  KEEP DRY,  KEEP AWAY FROM SUNLIGHT,  PROTECT FROM HEAT AND RADIOACTIVE SOURCES.]: Brand: PNEUMOSURE

## (undated) DEVICE — 15MM BATTERY POWERED MORCELLATOR SYSTEM WITH OBTURATOR: Brand: LINA XCISE™, LAPAROSCOPIC MORCELLATOR

## (undated) DEVICE — DECANTER BAG 9": Brand: MEDLINE INDUSTRIES, INC.

## (undated) DEVICE — SOL .9 100ML